# Patient Record
Sex: FEMALE | Race: WHITE | NOT HISPANIC OR LATINO | Employment: UNEMPLOYED | URBAN - METROPOLITAN AREA
[De-identification: names, ages, dates, MRNs, and addresses within clinical notes are randomized per-mention and may not be internally consistent; named-entity substitution may affect disease eponyms.]

---

## 2022-12-16 ENCOUNTER — HOSPITAL ENCOUNTER (EMERGENCY)
Facility: HOSPITAL | Age: 42
Discharge: HOME | End: 2022-12-17
Attending: EMERGENCY MEDICINE
Payer: MEDICAID

## 2022-12-16 DIAGNOSIS — G40.909 RECURRENT SEIZURES: ICD-10-CM

## 2022-12-16 DIAGNOSIS — R93.0 ABNORMAL HEAD CT: ICD-10-CM

## 2022-12-16 DIAGNOSIS — R56.9 SEIZURE: Primary | ICD-10-CM

## 2022-12-16 LAB
ALBUMIN SERPL BCP-MCNC: 3.4 G/DL (ref 3.5–5.2)
ALP SERPL-CCNC: 174 U/L (ref 55–135)
ALT SERPL W/O P-5'-P-CCNC: 32 U/L (ref 10–44)
ANION GAP SERPL CALC-SCNC: 11 MMOL/L (ref 8–16)
AST SERPL-CCNC: 48 U/L (ref 10–40)
BASOPHILS # BLD AUTO: 0.07 K/UL (ref 0–0.2)
BASOPHILS NFR BLD: 1 % (ref 0–1.9)
BILIRUB SERPL-MCNC: 0.8 MG/DL (ref 0.1–1)
BUN SERPL-MCNC: 5 MG/DL (ref 6–20)
CALCIUM SERPL-MCNC: 10 MG/DL (ref 8.7–10.5)
CHLORIDE SERPL-SCNC: 109 MMOL/L (ref 95–110)
CO2 SERPL-SCNC: 23 MMOL/L (ref 23–29)
CREAT SERPL-MCNC: 0.5 MG/DL (ref 0.5–1.4)
DIFFERENTIAL METHOD: ABNORMAL
EOSINOPHIL # BLD AUTO: 0.4 K/UL (ref 0–0.5)
EOSINOPHIL NFR BLD: 5.1 % (ref 0–8)
ERYTHROCYTE [DISTWIDTH] IN BLOOD BY AUTOMATED COUNT: 13.8 % (ref 11.5–14.5)
EST. GFR  (NO RACE VARIABLE): >60 ML/MIN/1.73 M^2
ETHANOL SERPL-MCNC: <10 MG/DL
GLUCOSE SERPL-MCNC: 100 MG/DL (ref 70–110)
HCT VFR BLD AUTO: 32.2 % (ref 37–48.5)
HCV AB SERPL QL IA: NORMAL
HGB BLD-MCNC: 10.2 G/DL (ref 12–16)
HIV 1+2 AB+HIV1 P24 AG SERPL QL IA: NORMAL
IMM GRANULOCYTES # BLD AUTO: 0.03 K/UL (ref 0–0.04)
IMM GRANULOCYTES NFR BLD AUTO: 0.4 % (ref 0–0.5)
INR PPP: 1.1 (ref 0.8–1.2)
LYMPHOCYTES # BLD AUTO: 2.1 K/UL (ref 1–4.8)
LYMPHOCYTES NFR BLD: 28.6 % (ref 18–48)
MCH RBC QN AUTO: 28.7 PG (ref 27–31)
MCHC RBC AUTO-ENTMCNC: 31.7 G/DL (ref 32–36)
MCV RBC AUTO: 91 FL (ref 82–98)
MONOCYTES # BLD AUTO: 0.5 K/UL (ref 0.3–1)
MONOCYTES NFR BLD: 6.8 % (ref 4–15)
NEUTROPHILS # BLD AUTO: 4.2 K/UL (ref 1.8–7.7)
NEUTROPHILS NFR BLD: 58.1 % (ref 38–73)
NRBC BLD-RTO: 0 /100 WBC
PLATELET # BLD AUTO: 330 K/UL (ref 150–450)
PMV BLD AUTO: 9.3 FL (ref 9.2–12.9)
POTASSIUM SERPL-SCNC: 4.2 MMOL/L (ref 3.5–5.1)
PROT SERPL-MCNC: 7.6 G/DL (ref 6–8.4)
PROTHROMBIN TIME: 11.5 SEC (ref 9–12.5)
RBC # BLD AUTO: 3.55 M/UL (ref 4–5.4)
SODIUM SERPL-SCNC: 143 MMOL/L (ref 136–145)
WBC # BLD AUTO: 7.24 K/UL (ref 3.9–12.7)

## 2022-12-16 PROCEDURE — 25000003 PHARM REV CODE 250

## 2022-12-16 PROCEDURE — 94761 N-INVAS EAR/PLS OXIMETRY MLT: CPT

## 2022-12-16 PROCEDURE — 99285 PR EMERGENCY DEPT VISIT,LEVEL V: ICD-10-PCS | Mod: ,,, | Performed by: EMERGENCY MEDICINE

## 2022-12-16 PROCEDURE — 99285 EMERGENCY DEPT VISIT HI MDM: CPT | Mod: 25

## 2022-12-16 PROCEDURE — 87389 HIV-1 AG W/HIV-1&-2 AB AG IA: CPT | Performed by: PHYSICIAN ASSISTANT

## 2022-12-16 PROCEDURE — 99285 EMERGENCY DEPT VISIT HI MDM: CPT | Mod: ,,, | Performed by: EMERGENCY MEDICINE

## 2022-12-16 PROCEDURE — 82077 ASSAY SPEC XCP UR&BREATH IA: CPT

## 2022-12-16 PROCEDURE — 80053 COMPREHEN METABOLIC PANEL: CPT

## 2022-12-16 PROCEDURE — 85025 COMPLETE CBC W/AUTO DIFF WBC: CPT

## 2022-12-16 PROCEDURE — 80177 DRUG SCRN QUAN LEVETIRACETAM: CPT

## 2022-12-16 PROCEDURE — 86803 HEPATITIS C AB TEST: CPT | Performed by: PHYSICIAN ASSISTANT

## 2022-12-16 PROCEDURE — 63600175 PHARM REV CODE 636 W HCPCS

## 2022-12-16 PROCEDURE — 85610 PROTHROMBIN TIME: CPT

## 2022-12-16 PROCEDURE — 96374 THER/PROPH/DIAG INJ IV PUSH: CPT

## 2022-12-16 RX ORDER — LACOSAMIDE 50 MG/1
100 TABLET ORAL ONCE
Status: COMPLETED | OUTPATIENT
Start: 2022-12-16 | End: 2022-12-16

## 2022-12-16 RX ORDER — HYDROMORPHONE HYDROCHLORIDE 1 MG/ML
0.5 INJECTION, SOLUTION INTRAMUSCULAR; INTRAVENOUS; SUBCUTANEOUS
Status: COMPLETED | OUTPATIENT
Start: 2022-12-16 | End: 2022-12-16

## 2022-12-16 RX ORDER — LEVETIRACETAM 500 MG/1
1000 TABLET ORAL ONCE
Status: COMPLETED | OUTPATIENT
Start: 2022-12-16 | End: 2022-12-16

## 2022-12-16 RX ORDER — LEVETIRACETAM 1000 MG/1
1000 TABLET ORAL 2 TIMES DAILY
COMMUNITY
Start: 2022-12-15 | End: 2022-12-17 | Stop reason: SDUPTHER

## 2022-12-16 RX ORDER — ACETAMINOPHEN 500 MG
1000 TABLET ORAL ONCE
Status: COMPLETED | OUTPATIENT
Start: 2022-12-16 | End: 2022-12-16

## 2022-12-16 RX ORDER — AMLODIPINE BESYLATE 10 MG/1
10 TABLET ORAL
COMMUNITY
Start: 2022-12-14

## 2022-12-16 RX ORDER — LEVOTHYROXINE SODIUM 25 UG/1
25 TABLET ORAL
COMMUNITY
Start: 2022-12-15

## 2022-12-16 RX ORDER — SERTRALINE HYDROCHLORIDE 25 MG/1
25 TABLET, FILM COATED ORAL
COMMUNITY
Start: 2022-09-29

## 2022-12-16 RX ORDER — LACOSAMIDE 50 MG/1
100 TABLET ORAL EVERY 12 HOURS
Status: DISCONTINUED | OUTPATIENT
Start: 2022-12-16 | End: 2022-12-16

## 2022-12-16 RX ORDER — LEVETIRACETAM 10 MG/ML
1000 INJECTION INTRAVASCULAR
Status: DISCONTINUED | OUTPATIENT
Start: 2022-12-16 | End: 2022-12-16

## 2022-12-16 RX ORDER — LEVETIRACETAM 500 MG/1
1000 TABLET ORAL 2 TIMES DAILY
Status: DISCONTINUED | OUTPATIENT
Start: 2022-12-16 | End: 2022-12-16

## 2022-12-16 RX ADMIN — ACETAMINOPHEN 1000 MG: 500 TABLET ORAL at 07:12

## 2022-12-16 RX ADMIN — HYDROMORPHONE HYDROCHLORIDE 0.5 MG: 1 INJECTION, SOLUTION INTRAMUSCULAR; INTRAVENOUS; SUBCUTANEOUS at 11:12

## 2022-12-16 RX ADMIN — LEVETIRACETAM 1000 MG: 500 TABLET, FILM COATED ORAL at 07:12

## 2022-12-16 RX ADMIN — LACOSAMIDE 100 MG: 50 TABLET, FILM COATED ORAL at 07:12

## 2022-12-17 VITALS
SYSTOLIC BLOOD PRESSURE: 142 MMHG | HEART RATE: 70 BPM | OXYGEN SATURATION: 98 % | TEMPERATURE: 98 F | DIASTOLIC BLOOD PRESSURE: 88 MMHG | WEIGHT: 110 LBS | RESPIRATION RATE: 16 BRPM

## 2022-12-17 PROBLEM — I62.9 INTRACRANIAL HEMORRHAGE: Status: ACTIVE | Noted: 2022-12-17

## 2022-12-17 PROCEDURE — 25000003 PHARM REV CODE 250: Performed by: INTERNAL MEDICINE

## 2022-12-17 PROCEDURE — 99284 PR EMERGENCY DEPT VISIT,LEVEL IV: ICD-10-PCS | Mod: ,,, | Performed by: STUDENT IN AN ORGANIZED HEALTH CARE EDUCATION/TRAINING PROGRAM

## 2022-12-17 PROCEDURE — 99284 EMERGENCY DEPT VISIT MOD MDM: CPT | Mod: ,,, | Performed by: STUDENT IN AN ORGANIZED HEALTH CARE EDUCATION/TRAINING PROGRAM

## 2022-12-17 RX ORDER — LEVETIRACETAM 1000 MG/1
1000 TABLET ORAL 2 TIMES DAILY
Qty: 60 TABLET | Refills: 0 | Status: SHIPPED | OUTPATIENT
Start: 2022-12-17 | End: 2023-01-16

## 2022-12-17 RX ORDER — LEVETIRACETAM 1000 MG/1
1000 TABLET ORAL 2 TIMES DAILY
Qty: 60 TABLET | Refills: 0 | Status: SHIPPED | OUTPATIENT
Start: 2022-12-17

## 2022-12-17 RX ORDER — ACETAMINOPHEN 500 MG
1000 TABLET ORAL
Status: DISCONTINUED | OUTPATIENT
Start: 2022-12-17 | End: 2022-12-17

## 2022-12-17 RX ORDER — ACETAMINOPHEN 500 MG
1000 TABLET ORAL
Status: COMPLETED | OUTPATIENT
Start: 2022-12-17 | End: 2022-12-17

## 2022-12-17 RX ADMIN — ACETAMINOPHEN 1000 MG: 500 TABLET ORAL at 03:12

## 2022-12-17 NOTE — ED TRIAGE NOTES
"Pt states "I had another seizure. I was at  yesterday for the same thing. I didn't take my keppra last night or today"  "

## 2022-12-17 NOTE — ED NOTES
Assumed care of patient. Patient is alert and resting comfortably in bed in NAD. BP, cardiac, and O2 monitoring continued. Patient updated on plan of care, pt denies any needs or complaints at this time. Bed locked in lowest position, side rails up x2, call light within reach. VSS. Will continue to monitor.

## 2022-12-17 NOTE — HPI
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 Pt states she has not been taking her AEDs regularly since discharge and had a typical general seizure. CT showed left frontal hyper density with surrounding edema concerning for hemorrhage. Neurosurgery was consulted.
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
## 2022-12-17 NOTE — SUBJECTIVE & OBJECTIVE
(Not in a hospital admission)      Review of patient's allergies indicates:  No Known Allergies    History reviewed. No pertinent past medical history.  History reviewed. No pertinent surgical history.  Family History    None       Tobacco Use    Smoking status: Not on file    Smokeless tobacco: Not on file   Substance and Sexual Activity    Alcohol use: Not on file    Drug use: Not on file    Sexual activity: Not on file     Review of Systems   Constitutional:  Negative for fever.   HENT:  Negative for ear discharge and hearing loss.    Eyes:  Negative for visual disturbance.   Respiratory:  Negative for shortness of breath.    Cardiovascular:  Negative for chest pain.   Gastrointestinal:  Negative for nausea and vomiting.   Genitourinary:  Negative for urgency.   Musculoskeletal:  Negative for back pain and neck pain.   Neurological:  Positive for dizziness, seizures and weakness. Negative for light-headedness, numbness and headaches.   Psychiatric/Behavioral:  Negative for agitation and confusion.    Objective:     Weight: 49.9 kg (110 lb)  There is no height or weight on file to calculate BMI.  Vital Signs (Most Recent):  Temp: 98 °F (36.7 °C) (12/16/22 2125)  Pulse: 79 (12/16/22 2125)  Resp: 18 (12/16/22 2342)  BP: (!) 145/94 (12/16/22 2125)  SpO2: 98 % (12/16/22 2125)   Vital Signs (24h Range):  Temp:  [97.9 °F (36.6 °C)-98 °F (36.7 °C)] 98 °F (36.7 °C)  Pulse:  [70-79] 79  Resp:  [18-20] 18  SpO2:  [96 %-98 %] 98 %  BP: (133-145)/(72-94) 145/94                     Female External Urinary Catheter 12/16/22 2319 (Active)       Physical Exam    Neurosurgery Physical Exam  General: well developed, well nourished, no distress.   Head: normocephalic, atraumatic  CV: RRR, pulses equal bilateral  Pulmonary: normal respirations, no signs of respiratory distress  Abdomen: soft, non-distended  Skin: Skin is warm, dry and intact.    Neuro:  E4V5M6  Awake, alert, Ox4  PERRL, EOMI  CNII-XII grossly intact  Motor: Follows  commands full strength right side. Hemiparetic but antigravity throughout left side  SILT    Significant Labs:  Recent Labs   Lab 12/16/22  1850         K 4.2      CO2 23   BUN 5*   CREATININE 0.5   CALCIUM 10.0     Recent Labs   Lab 12/16/22  1850   WBC 7.24   HGB 10.2*   HCT 32.2*        Recent Labs   Lab 12/16/22  1850   INR 1.1     Microbiology Results (last 7 days)       ** No results found for the last 168 hours. **          All pertinent labs from the last 24 hours have been reviewed.    Significant Diagnostics:  I have reviewed all pertinent imaging results/findings within the past 24 hours.  I have reviewed and interpreted all pertinent imaging results/findings within the past 24 hours.

## 2022-12-17 NOTE — ASSESSMENT & PLAN NOTE
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 Pt states she has not been taking her AEDs regularly since discharge and had a typical general seizure. CT showed left frontal hyper density with surrounding edema concerning for hemorrhage. Neurosurgery was consulted.    Plan:  No surgical intervention indicated  FU repeat CTH for stability  Load with AED  Pt needs neurology workup/follow up for seizure history and education on AED

## 2022-12-17 NOTE — ED PROVIDER NOTES
Signout Note  I received signout from the previous providers.     Chief complaint:  Seizures (Unwitnessed seizure per pt, unsure of duration. Extensive seizure hx r/t epilepsy. Compliant with meds. AAOX4, GCS15 at this time. )    Pertinent History, ED course, Disposition:    Per sign out and chart review: Harvey Gould is a 42 y.o. 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     Pt signed out to me pending:  CT head without contrast with plan disposition home pending final neurosurgery recommendations.    Update/ Disposition:  Repeat CT head returned with no interval changes.  Neurosurgery saw the CT head and stated the patient was safe for discharge home.  Patient was discharged home with a script for Keppra.  She states that she feels safe going home and protect her medications.  She walk safely with nursing yet does not have a walker to go home so she was discharged with a walker.  She states that she is a walker at home.  She was given an ambulatory referral for Neurology.  Her pain is controlled.  Her repeat neuro exam has no focalities.  Strict return precautions were discussed.  She was given the number to follow-up with Neurology.  Patient deemed stable for discharge after all questions were answered.      Patient,caregiver and/or family understands the plan and verbalized agreement. All questions answered.     Diagnostic Impression:    1. Seizure    2. Recurrent seizures    3. Abnormal head CT         ED Disposition Condition    Discharge Stable          ED Prescriptions       Medication Sig Dispense Start Date End Date Auth. Provider    levETIRAcetam (KEPPRA) 1000 MG tablet Take 1 tablet (1,000 mg total) by mouth 2 (two) times daily. 60 tablet 12/17/2022 -- Morena Charles,     levETIRAcetam (KEPPRA) 1000 MG tablet  Take 1 tablet (1,000 mg total) by mouth 2 (two) times daily. 60 tablet 12/17/2022 1/16/2023 Huong Haynes MD          Follow-up Information       Follow up With Specialties Details Why Contact Info Additional Information    Hima Gudino MD Family Medicine In 2 days  [address] [facility] [facility]  [place] [address]  [phone]       [facility] - Emergency Dept Emergency Medicine  If symptoms worsen [address] [id number] [phone]     [facility] - Neurology [facility] Neurology Schedule an appointment as soon as possible for a visit in 1 day  [address] [facility] [address] [id number] [phone] [facility] [place] [facility] Building, 7th Floor Please park in [facility] and take Clinic elevator                       Vitals:    12/17/22 0200   BP: (!) 145/90   Pulse: 72   Resp:    Temp:        Labs Reviewed   CBC W/ AUTO DIFFERENTIAL - Abnormal; Notable for the following components:       Result Value    RBC 3.55 (*)     Hemoglobin 10.2 (*)     Hematocrit 32.2 (*)     MCHC 31.7 (*)     All other components within normal limits   COMPREHENSIVE METABOLIC PANEL - Abnormal; Notable for the following components:    BUN 5 (*)     Albumin 3.4 (*)     Alkaline Phosphatase 174 (*)     AST 48 (*)     All other components within normal limits   HIV 1 / 2 ANTIBODY    Narrative:     Release to patient->Immediate   HEPATITIS C ANTIBODY    Narrative:     Release to patient->Immediate   PROTIME-INR   ALCOHOL,MEDICAL (ETHANOL)   LEVETIRACETAM  (KEPPRA) LEVEL       Imaging Studies:    CT Head Without Contrast   Final Result      No interval detrimental changes from prior exam on 12/16/2022 at 20:11.      Similar-appearing left inferior frontal hemorrhage with surrounding vasogenic edema and mass effect.  Underlying mass lesion not excluded.  Stable 5 mm rightward midline shift.      Similar calcifications versus blood products along the right superior vermis and posterior midbrain.       Comparison with any outside imaging would be helpful.      Electronically signed by resident: Luke Varghese   Date:    12/17/2022   Time:    03:23      Electronically signed by: Antonio Pappas MD   Date:    12/17/2022   Time:    03:52      CT Head Without Contrast   Final Result   Abnormal      Calcified or hemorrhagic gyriform left subfrontal lesion with adjacent vasogenic edema in the left frontal lobe. Lesion measures approximately 3.2 x 1.6 x 1.8 cm and appears to have somewhat gyriform configuration along the inferior left frontal lobe. Some localized mass effect in the left frontal lobe with approximately 5 mm left to right midline shift of the anterior inter hemispheric fissure.      Additional calcifications versus blood products along the right superior vermis and adjacent posterior midbrain along either side of the ambient cistern on the right.      Right anterior temporal encephalomalacia extending into the right insula, deep gray matter and internal capsule into the right corona radiata and centrum semiovale white matter. Minimal gyriform calcifications right temporal lobe.      Nondisplaced left open fracture of uncertain age.      Additional findings as above.      This report was flagged in Epic as abnormal.      The critical information above was relayed directly by Antonio Pappas MD by [employer] secure chat to Morena Charles on 12/16/2022 at 20:48.         Electronically signed by: Antonio Pappas MD   Date:    12/16/2022   Time:    20:49          Medications Given:  Medications   levETIRAcetam tablet 1,000 mg (1,000 mg Oral Given 12/16/22 1926)   lacosamide tablet 100 mg (100 mg Oral Given 12/16/22 1926)   acetaminophen tablet 1,000 mg (1,000 mg Oral Given 12/16/22 1926)   HYDROmorphone injection 0.5 mg (0.5 mg Intravenous Given 12/16/22 2342)   acetaminophen tablet 1,000 mg (1,000 mg Oral Given 12/17/22 0331)                Please note that this dictation was completed with computer voice recognition  software.  Quite often unanticipated grammatical, syntax, homophones, and other interpretive errors are inadvertently transcribed by the computer software.  Please disregard these errors.  Please excuse any errors that have escaped final proofreading.         Huong Haynes MD  Resident  12/17/22 0226

## 2022-12-17 NOTE — DISCHARGE INSTRUCTIONS
Diagnosis:   1. Seizure    2. Recurrent seizures        Tests you had showed:   Labs Reviewed   CBC W/ AUTO DIFFERENTIAL - Abnormal; Notable for the following components:       Result Value    RBC 3.55 (*)     Hemoglobin 10.2 (*)     Hematocrit 32.2 (*)     MCHC 31.7 (*)     All other components within normal limits   COMPREHENSIVE METABOLIC PANEL - Abnormal; Notable for the following components:    BUN 5 (*)     Albumin 3.4 (*)     Alkaline Phosphatase 174 (*)     AST 48 (*)     All other components within normal limits   HIV 1 / 2 ANTIBODY    Narrative:     Release to patient->Immediate   HEPATITIS C ANTIBODY    Narrative:     Release to patient->Immediate   PROTIME-INR   ALCOHOL,MEDICAL (ETHANOL)   LEVETIRACETAM  (KEPPRA) LEVEL      CT Head Without Contrast   Final Result      No interval detrimental changes from prior exam on 12/16/2022 at 20:11.      Similar-appearing left inferior frontal hemorrhage with surrounding vasogenic edema and mass effect.  Underlying mass lesion not excluded.  Stable 5 mm rightward midline shift.      Similar calcifications versus blood products along the right superior vermis and posterior midbrain.      Comparison with any outside imaging would be helpful.      Electronically signed by resident: Luke Varghese   Date:    12/17/2022   Time:    03:23      Electronically signed by: Antonio Pappas MD   Date:    12/17/2022   Time:    03:52      CT Head Without Contrast   Final Result   Abnormal      Calcified or hemorrhagic gyriform left subfrontal lesion with adjacent vasogenic edema in the left frontal lobe. Lesion measures approximately 3.2 x 1.6 x 1.8 cm and appears to have somewhat gyriform configuration along the inferior left frontal lobe. Some localized mass effect in the left frontal lobe with approximately 5 mm left to right midline shift of the anterior inter hemispheric fissure.      Additional calcifications versus blood products along the right superior vermis and adjacent  posterior midbrain along either side of the ambient cistern on the right.      Right anterior temporal encephalomalacia extending into the right insula, deep gray matter and internal capsule into the right corona radiata and centrum semiovale white matter. Minimal gyriform calcifications right temporal lobe.      Nondisplaced left open fracture of uncertain age.      Additional findings as above.      This report was flagged in Epic as abnormal.      The critical information above was relayed directly by Antonio Pappas MD by [facility] secure chat to Morena Charles on 12/16/2022 at 20:48.         Electronically signed by: Antonio Pappas MD   Date:    12/16/2022   Time:    20:49          Treatments you received were:   Medications   levETIRAcetam tablet 1,000 mg (1,000 mg Oral Given 12/16/22 1926)   lacosamide tablet 100 mg (100 mg Oral Given 12/16/22 1926)   acetaminophen tablet 1,000 mg (1,000 mg Oral Given 12/16/22 1926)   HYDROmorphone injection 0.5 mg (0.5 mg Intravenous Given 12/16/22 2342)   acetaminophen tablet 1,000 mg (1,000 mg Oral Given 12/17/22 0331)       Home Care Instructions:  - Medications: Continue taking your home medications as prescribed    Follow-Up Plan:  - Follow-up with: Primary care doctor within 1-2  days  - Additional testing and/or evaluation will be directed by your primary doctor    Return to the Emergency Department for symptoms including but not limited to: worsening symptoms, severe back pain, shortness of breath or chest pain, vomiting with inability to hold down fluids, blood in vomit or poop, fevers greater than 100.4°F, passing out/fainting/unconsciousness, or other concerning symptoms.     No future appointments.

## 2022-12-17 NOTE — ED NOTES
"Patient identifiers verified and correct for Harvey Gould  C/C: Pt states "I had another seizure. I was at  yesterday for the same thing. I didn't take my keppra last night or today"  APPEARANCE: awake and alert in no acute distress.  SKIN: warm, dry and intact. No breakdown or bruising.  MUSCULOSKELETAL: Patient moving all extremities spontaneously, no obvious swelling or deformities noted. Ambulates independently at baseline, currently c/o generalized weakness.  RESPIRATORY: Denies shortness of breath. Respirations unlabored.   CARDIAC: Denies CP, 2+ distal pulses; no peripheral edema  ABDOMEN: soft, non-tender, and non-distended, Denies N/V  : voids spontaneously, denies difficulty  Neurologic: AAO x 4; follows commands equal strength in all extremities; denies numbness/tingling. Denies dizziness. C/o h/a, confirms hitting head   "

## 2022-12-17 NOTE — CONSULTS
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 Pt states she has not been taking her AEDs regularly since discharge and had a typical general seizure. CT showed left frontal hyper density with surrounding edema concerning for hemorrhage. Neurosurgery was consulted.      (Not in a hospital admission)      Review of patient's allergies indicates:  No Known Allergies    History reviewed. No pertinent past medical history.  History reviewed. No pertinent surgical history.  Family History    None       Tobacco Use    Smoking status: Not on file    Smokeless tobacco: Not on file   Substance and Sexual Activity    Alcohol use: Not on file    Drug use: Not on file    Sexual activity: Not on file     Review of Systems   Constitutional:  Negative for fever.   HENT:  Negative for ear discharge and hearing loss.    Eyes:  Negative for visual disturbance.   Respiratory:  Negative for shortness of breath.    Cardiovascular:  Negative for chest pain.   Gastrointestinal:  Negative for nausea and vomiting.   Genitourinary:  Negative for urgency.   Musculoskeletal:  Negative for back pain and neck pain.   Neurological:  Positive for dizziness, seizures and weakness. Negative for light-headedness, numbness and headaches.   Psychiatric/Behavioral:  Negative for agitation and confusion.    Objective:     Weight: 49.9 kg (110 lb)  There is no height or weight on file to calculate BMI.  Vital Signs (Most Recent):  Temp: 98 °F (36.7 °C)  (12/16/22 2125)  Pulse: 79 (12/16/22 2125)  Resp: 18 (12/16/22 2342)  BP: (!) 145/94 (12/16/22 2125)  SpO2: 98 % (12/16/22 2125)   Vital Signs (24h Range):  Temp:  [97.9 °F (36.6 °C)-98 °F (36.7 °C)] 98 °F (36.7 °C)  Pulse:  [70-79] 79  Resp:  [18-20] 18  SpO2:  [96 %-98 %] 98 %  BP: (133-145)/(72-94) 145/94                     Female External Urinary Catheter 12/16/22 2319 (Active)       Physical Exam    Neurosurgery Physical Exam  General: well developed, well nourished, no distress.   Head: normocephalic, atraumatic  CV: RRR, pulses equal bilateral  Pulmonary: normal respirations, no signs of respiratory distress  Abdomen: soft, non-distended  Skin: Skin is warm, dry and intact.    Neuro:  E4V5M6  Awake, alert, Ox4  PERRL, EOMI  CNII-XII grossly intact  Motor: Follows commands full strength right side. Hemiparetic but antigravity throughout left side  SILT    Significant Labs:  Recent Labs   Lab 12/16/22  1850         K 4.2      CO2 23   BUN 5*   CREATININE 0.5   CALCIUM 10.0     Recent Labs   Lab 12/16/22  1850   WBC 7.24   HGB 10.2*   HCT 32.2*        Recent Labs   Lab 12/16/22  1850   INR 1.1     Microbiology Results (last 7 days)       ** No results found for the last 168 hours. **          All pertinent labs from the last 24 hours have been reviewed.    Significant Diagnostics:  I have reviewed all pertinent imaging results/findings within the past 24 hours.  I have reviewed and interpreted all pertinent imaging results/findings within the past 24 hours.    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 Pt states she has not been taking her AEDs regularly since discharge and had a typical general seizure. CT  showed left frontal hyper density with surrounding edema concerning for hemorrhage. Neurosurgery was consulted.    Plan:  No surgical intervention indicated  FU repeat CTH for stability  Load with AED  Pt needs neurology workup/follow up for seizure history and education on AED            Thank you for your consult. I will follow-up with patient. Please contact us if you have any additional questions.    Chi Deluna MD  Neurosurgery  [facility] - Emergency Dept

## 2022-12-19 LAB — LEVETIRACETAM SERPL-MCNC: 12.9 UG/ML (ref 3–60)

## 2022-12-19 NOTE — PLAN OF CARE
D/C planning:  Unable to get thru any phone numbers in the chart  Emailed the patient local numbers for [place] non medicaid transport  Case mgt to remain supportive.    [facility] - Emergency Dept  Initial Discharge Assessment       Primary Care Provider: Hima Gudino MD    Admission Diagnosis: No admission diagnoses are documented for this encounter.    Admission Date: 12/16/2022  Expected Discharge Date:          Payor: MEDICAID / Plan: [facility] COMMUNITY PLAN [facility] ([place] MEDICAID) / Product Type: Managed Medicaid /     Extended Emergency Contact Information  Primary Emergency Contact: ALFREDITO Gouldag  Mobile Phone: [phone]  Relation: Sister  Secondary Emergency Contact: MeraManjeet  Mobile Phone: [phone]  Relation: Brother  Preferred language: English   needed? No    Discharge Plan A: (P) Home         [id number] - [place] - [address] [facility] [address]  [address] [place] [address]  Phone: [phone] Fax: [phone]      Initial Assessment (most recent)       Adult Discharge Assessment - 12/19/22 1306          Discharge Assessment    Assessment Type Discharge Planning Assessment (P)      Confirmed/corrected address, phone number and insurance No (P)      Source of Information health record (P)      Discharge Plan A Home (P)      DME Needed Upon Discharge  none (P)

## 2022-12-19 NOTE — PLAN OF CARE
12/19/22 1309   Post-Acute Status   Post-Acute Authorization Other   Other Status Community Services   Hospital Resources/Appts/Education Provided Provided patient/caregiver with written discharge plan information   Discharge Plan   Discharge Plan A Home

## 2023-01-04 ENCOUNTER — HOSPITAL ENCOUNTER (EMERGENCY)
Facility: HOSPITAL | Age: 43
Discharge: HOME | End: 2023-01-04
Attending: STUDENT IN AN ORGANIZED HEALTH CARE EDUCATION/TRAINING PROGRAM
Payer: MEDICAID

## 2023-01-04 VITALS
RESPIRATION RATE: 15 BRPM | HEART RATE: 111 BPM | WEIGHT: 95 LBS | TEMPERATURE: 98 F | HEIGHT: 57 IN | DIASTOLIC BLOOD PRESSURE: 75 MMHG | BODY MASS INDEX: 20.49 KG/M2 | OXYGEN SATURATION: 99 % | SYSTOLIC BLOOD PRESSURE: 128 MMHG

## 2023-01-04 DIAGNOSIS — G44.309 POST-TRAUMATIC HEADACHE, NOT INTRACTABLE, UNSPECIFIED CHRONICITY PATTERN: Primary | ICD-10-CM

## 2023-01-04 DIAGNOSIS — R29.6 FREQUENT FALLS: ICD-10-CM

## 2023-01-04 PROCEDURE — 99285 EMERGENCY DEPT VISIT HI MDM: CPT | Mod: 25

## 2023-01-04 PROCEDURE — 96374 THER/PROPH/DIAG INJ IV PUSH: CPT

## 2023-01-04 PROCEDURE — 96375 TX/PRO/DX INJ NEW DRUG ADDON: CPT

## 2023-01-04 PROCEDURE — 63600175 PHARM REV CODE 636 W HCPCS: Performed by: STUDENT IN AN ORGANIZED HEALTH CARE EDUCATION/TRAINING PROGRAM

## 2023-01-04 RX ORDER — PROCHLORPERAZINE EDISYLATE 5 MG/ML
10 INJECTION INTRAMUSCULAR; INTRAVENOUS ONCE
Status: COMPLETED | OUTPATIENT
Start: 2023-01-04 | End: 2023-01-04

## 2023-01-04 RX ORDER — DROPERIDOL 2.5 MG/ML
1.25 INJECTION, SOLUTION INTRAMUSCULAR; INTRAVENOUS ONCE
Status: COMPLETED | OUTPATIENT
Start: 2023-01-04 | End: 2023-01-04

## 2023-01-04 RX ORDER — ONDANSETRON 4 MG/1
4 TABLET, ORALLY DISINTEGRATING ORAL EVERY 6 HOURS PRN
Qty: 15 TABLET | Refills: 0 | Status: SHIPPED | OUTPATIENT
Start: 2023-01-04 | End: 2023-12-07 | Stop reason: CLARIF

## 2023-01-04 RX ADMIN — PROCHLORPERAZINE EDISYLATE 10 MG: 5 INJECTION INTRAMUSCULAR; INTRAVENOUS at 02:01

## 2023-01-04 RX ADMIN — DROPERIDOL 1.25 MG: 2.5 INJECTION, SOLUTION INTRAMUSCULAR; INTRAVENOUS at 03:01

## 2023-01-04 NOTE — ED TRIAGE NOTES
Harvey Gould, a 42 y.o. female presents to the ED w/ complaint of headache after having a fall yesterday and hitting her head    Triage note:  Chief Complaint   Patient presents with    Headache     Headache since last night after falling and hitting her head.      Review of patient's allergies indicates:  No Known Allergies  No past medical history on file.

## 2023-01-04 NOTE — ED NOTES
LOC: The patient is awake, alert, and oriented to self, place, time, and situation. Pt is calm and cooperative. Affect is appropriate.  Speech is appropriate and clear.     APPEARANCE: Patient resting comfortably in no acute distress.  Patient is clean and well groomed.    SKIN: The skin is warm and dry; color consistent with ethnicity.  Patient has normal skin turgor and moist mucus membranes.  Skin intact; no breakdown or bruising noted.     MUSCULOSKELETAL: Patient moving R upper and lower extremities without difficulty; pt has difficulty at baseline to the L upper and lower extremities from a previous CVA; denies pain in the extremities or back.      RESPIRATORY: Airway is open and patent. Respirations spontaneous, even, easy, and non-labored.  Patient has a normal effort and rate.  No accessory muscle use noted. Denies cough.     CARDIAC:  Normal rate noted.  No peripheral edema noted. No complaints of chest pain.      ABDOMEN: Soft and non tender to palpation.  No distention noted. Pt denies abdominal pain; denies nausea, vomiting, diarrhea, or constipation.    NEUROLOGIC: Eyes open spontaneously.  Behavior appropriate to situation.  Follows commands; facial expression symmetrical.  Purposeful motor response noted; normal sensation to pts baseline in all extremities. Pt reports headache; denies lightheadedness or dizziness; denies visual disturbances; denies loss of balance.

## 2023-01-04 NOTE — ED PROVIDER NOTES
NAME:  Harvey Gould  CSN:     [id number]  MRN:    [id number]  ADMIT DATE: 1/4/2023        eMERGENCY dEPARTMENT eNCOUnter    CHIEF COMPLAINT    Chief Complaint   Patient presents with    Headache     Headache since last night after falling and hitting her head.        ESDRAS Ty is a 42 y.o. female with a past medical history of  has no past medical history on file.     she presents to the ED due to persistent diffuse HA with nausea, vomiting, photophobia.  Patient states she has had headaches similar to this fairly frequently since sustaining subdural hematoma a little over a month ago.  She states she does fall frequently due to residual left-sided deficits from previous stroke and that her apartment is too small to use a walker or her 3 pronged cane which causes her to slip and fall.  She does have a partner at home, but does not feel that they are very appropriate caregiver.  Was last evaluated 2 days ago after having a ground level fall when she hit the back of her head on the bathtub.  States she is had persistent headache since that time with 2 episodes of vomiting, 1 yesterday and 1 today.  She was evaluated at that time without any interval progression of previous intracranial bleed.  She has not taken anything for her symptoms at home thus far as she was concerned that the active medications may interact with her seizure medications.  States she just started getting seizures fairly recently and does not currently see Neurology as she can not travel to where her previous neurologist was since she can not currently dry.  She does note that there was some thought that her seizures may be related to stopping alcohol use and she states she is not drank any alcohol in 1 month.  She denies any new numbness or weakness and just notes her chronic residual numbness and weakness to the left arm and leg.  She states she is under increased stress at home due to her being currently unemployed and giving a guardianship of  "her son to her sister.      HPI       PAST MEDICAL HISTORY  History reviewed. No pertinent past medical history.    SURGICAL HISTORY    History reviewed. No pertinent surgical history.    FAMILY HISTORY    History reviewed. No pertinent family history.    SOCIAL HISTORY    Social History     Socioeconomic History    Marital status: Single       MEDICATIONS  Current Outpatient Medications   Medication Instructions    amLODIPine (NORVASC) 10 mg, Oral    levETIRAcetam (KEPPRA) 1,000 mg, Oral, 2 times daily    levETIRAcetam (KEPPRA) 1,000 mg, Oral, 2 times daily    levothyroxine (SYNTHROID) 25 mcg, Oral    ondansetron (ZOFRAN-ODT) 4 mg, Oral, Every 6 hours PRN    sertraline (ZOLOFT) 25 mg, Oral       ALLERGIES    Review of patient's allergies indicates:  No Known Allergies      REVIEW OF SYSTEMS   Review of Systems   Constitutional:  Negative for fever.   HENT:  Negative for sore throat.    Eyes:  Positive for photophobia. Negative for visual disturbance.   Respiratory:  Negative for shortness of breath.    Cardiovascular:  Negative for chest pain.   Gastrointestinal:  Positive for nausea and vomiting. Negative for abdominal pain.   Genitourinary:  Negative for dysuria.   Musculoskeletal:  Negative for back pain.   Skin:  Negative for rash.   Neurological:  Positive for weakness (chronic LUE, LLE), numbness (LUE, LLE, chronic) and headaches.   Hematological:  Does not bruise/bleed easily.        PHYSICAL EXAM    Reviewed Triage Note    VITAL SIGNS:   ED Triage Vitals [01/04/23 1303]   Enc Vitals Group      BP (!) 129/91      Pulse 87      Resp       Temp 98.4 °F (36.9 °C)      Temp src Oral      SpO2 98 %      Weight 95 lb      Height 4' 9"      Head Circumference       Peak Flow       Pain Score       Pain Loc       Pain Edu?       Excl. in GC?        Patient Vitals for the past 24 hrs:   BP Temp Temp src Pulse Resp SpO2 Height Weight   01/04/23 1511 -- -- -- -- 16 -- -- --   01/04/23 1510 -- -- -- -- 20 -- -- -- " "  01/04/23 1502 99/61 -- -- 82 (!) 22 96 % -- --   01/04/23 1329 113/77 -- -- 83 -- 98 % -- --   01/04/23 1303 (!) 129/91 98.4 °F (36.9 °C) Oral 87 -- 98 % 4' 9" (1.448 m) 43.1 kg (95 lb)       Physical Exam    Nursing note and vitals reviewed.  Constitutional: She appears well-developed and well-nourished.   HENT:   Head: Normocephalic.   2 x 2 cm scalp hematoma to R posterior occiput. No bogginess noted.    Eyes: EOM are normal. Pupils are equal, round, and reactive to light.   Neck: Neck supple.   Normal range of motion.  Cardiovascular:  Normal rate and regular rhythm.           Pulmonary/Chest: Breath sounds normal. No respiratory distress.   Abdominal: Abdomen is soft. There is no abdominal tenderness.   Musculoskeletal:         General: Normal range of motion.      Cervical back: Normal range of motion and neck supple.     Neurological: She is alert and oriented to person, place, and time. No cranial nerve deficit.   4/5 strength of LUE, LLE. 5/5 strength of RUE and RLE.    Skin: Skin is warm and dry.   Psychiatric: She has a normal mood and affect.          EKG     Interpreted by EM physician if performed:               LABS  Pertinent labs reviewed. (See chart for details)   Labs Reviewed - No data to display      RADIOLOGY          Imaging Results              CT Head Without Contrast (Final result)  Result time 01/04/23 15:01:46      Final result by Chi Taylor MD (01/04/23 15:01:46)                   Impression:      Improved edema/hemorrhage in the inferior left frontal lobe.    Stable curvilinear hyperattenuation in the right superior cerebellum and right dorsal midbrain, without corresponding mass effect or edema.  This is thought unlikely to reflect hemorrhage given stability, possible underlying cavernous malformation or other dystrophic calcification.    Stable large region encephalomalacia in right cerebral hemisphere, presumably postsurgical in nature.    Left maxillary " sinusitis.    Recommend correlation clinical history and prior imaging.  MRI for further assessment if warranted.      Electronically signed by: Chi Taylor MD  Date:    01/04/2023  Time:    15:01               Narrative:    EXAMINATION:  CT HEAD WITHOUT CONTRAST    CLINICAL HISTORY:  Headache, new or worsening, neuro deficit (Age 19-49y);subacute SAH;    TECHNIQUE:  Low dose axial CT images obtained throughout the head without the use of intravenous contrast.  Axial, sagittal and coronal reconstructions were performed.    COMPARISON:  12/17/2022    FINDINGS:  Ventricles stable in size, without evidence of hydrocephalus.  Ex vacuo dilatation of the right lateral ventricle.    Large area encephalomalacia in the right cerebral hemisphere, centered in the right temporal lobe and subinsular region.  Presumably postsurgical noting overlying craniotomy site.    Decreased volume of intraparenchymal hemorrhage in the inferior left frontal lobe with improved overlying edema.  No new parenchymal hemorrhage elsewhere.    Persistent curvilinear hyperattenuation in the right superior cerebellum as well as the dorsal right midbrain.  No associated mass effect or edema.    No new hemorrhage or major vascular distribution infarct elsewhere.    No extra-axial blood or fluid collections.    Skull/extracranial contents (limited evaluation):    No displaced calvarial fracture.    Persistent inflammatory changes throughout the partially visualized left maxillary sinus.                                        PROCEDURES    Procedures      ED COURSE & MEDICAL DECISION MAKING    Pertinent Labs & Imaging studies reviewed. (See chart for details and specific orders.)        Summary of review of records:   ROR shows ED visits 12/27, 12/29, 1/2 for head injuries.   CT head 1/2:   1. No new acute intracranial hemorrhage, hydrocephalus, herniation or midline shift.   2. Unchanged areas of subacute blood in the inferior parasagittal left  "frontal lobe and right posterior fossa.   3. Remote right temporal lobe infarct/encephalomalacia.     Ct neck 1/2:  No acute abnormality    11/23 admitted for subdural hematoma s/p status epilepticus, EtOH encephalopathy- discharge summary and d/c 12/14. "42-year-old woman with history of chronic alcohol abuse, ruptured cerebral aneurysm who presents with persistent seizure activity. Patient was initially refractory to treatment with Ativan and Keppra. She was emergently intubated for airway protection and developing hypoxia. She was admitted to the intensive care unit. Head imaging showed a frontal hematoma with subarachnoid bleeding. Neurology and Neurosurgery were consulted. Patient underwent cerebral angiography that was negative for acute aneurysm rupture or other active bleeding. She had complications including encephalopathy believed to be secondary to alcohol withdrawal, hyponatremia, hypertension."    MDM:  Harvey Gould is a 42 y.o. female who presents with 2 days of persistent headache, photophobia, nausea and vomiting since last fall on January 2nd.  Appreciate triage note, however, patient tells me that she is not had another fall since her recent ED visit at which time CT imaging of the head and neck were reassuring.  She is not taken anything at home for her symptoms since discharge from the emergency department 2 days ago.  She has had intermittent headaches over the past month since her traumatic brain injury and she states this does feel similar to that.  However, given history of subacute intracranial hemorrhage in the setting of recent repeat trauma with vomiting, will repeat head CT at this time.  Will initiate Compazine for headache and reassess.  Denies any new seizure-like activity.    Differential diagnosis includes but is not limited to:  Acute on chronic subdural hematoma, post concussive syndrome, migrainous headache            Medications   prochlorperazine injection Soln 10 mg (10 mg " Intravenous Given 1/4/23 1405)   droperidoL injection 1.25 mg (1.25 mg Intravenous Given 1/4/23 1536)       ED Course as of 01/04/23 1625   Wed Jan 04, 2023   1507 CT Head Without Contrast  Improved edema/hemorrhage in the inferior left frontal lobe.     Stable curvilinear hyperattenuation in the right superior cerebellum and right dorsal midbrain, without corresponding mass effect or edema.  This is thought unlikely to reflect hemorrhage given stability, possible underlying cavernous malformation or other dystrophic calcification.     Stable large region encephalomalacia in right cerebral hemisphere, presumably postsurgical in nature.     Left maxillary sinusitis. [HL]   1523 On reassessment, HA/nausea remains about the same, will trial additional meds prior to d/c home.  [HL]      ED Course User Index  [HL] Ivonne Babin DO     Patient does feel improved.  Neurology referral provided as well as prescription of Zofran.  Reasons to return discussed.  Stable at time discharge.    FINAL IMPRESSION    Final diagnoses:  [G44.309] Post-traumatic headache, not intractable, unspecified chronicity pattern (Primary)  [R29.6] Frequent falls       DISPOSITION  Patient discharged in stable condition             DISCLAIMER: This note was prepared with [email or web address] voice recognition transcription software. Garbled syntax, mangled pronouns, and other bizarre constructions may be attributed to that software system.      DO Ivonne Conte DO  01/04/23 1623

## 2023-07-05 ENCOUNTER — ANESTHESIA EVENT (OUTPATIENT)
Dept: ENDOSCOPY | Facility: OTHER | Age: 43
End: 2023-07-05
Payer: MEDICARE

## 2023-07-06 ENCOUNTER — HOSPITAL ENCOUNTER (OUTPATIENT)
Facility: OTHER | Age: 43
Discharge: HOME | End: 2023-07-06
Attending: INTERNAL MEDICINE | Admitting: INTERNAL MEDICINE
Payer: MEDICARE

## 2023-07-06 ENCOUNTER — ANESTHESIA (OUTPATIENT)
Dept: ENDOSCOPY | Facility: OTHER | Age: 43
End: 2023-07-06
Payer: MEDICARE

## 2023-07-06 VITALS
DIASTOLIC BLOOD PRESSURE: 83 MMHG | RESPIRATION RATE: 16 BRPM | HEIGHT: 57 IN | BODY MASS INDEX: 20.49 KG/M2 | OXYGEN SATURATION: 99 % | HEART RATE: 78 BPM | WEIGHT: 95 LBS | SYSTOLIC BLOOD PRESSURE: 118 MMHG | TEMPERATURE: 98 F

## 2023-07-06 DIAGNOSIS — R13.10 DYSPHAGIA, UNSPECIFIED TYPE: Primary | ICD-10-CM

## 2023-07-06 DIAGNOSIS — Z13.9 SCREENING DUE: ICD-10-CM

## 2023-07-06 LAB — HCG INTACT+B SERPL-ACNC: <1.2 MIU/ML

## 2023-07-06 PROCEDURE — 37000008 HC ANESTHESIA 1ST 15 MINUTES: Performed by: INTERNAL MEDICINE

## 2023-07-06 PROCEDURE — 63600175 PHARM REV CODE 636 W HCPCS: Performed by: ANESTHESIOLOGY

## 2023-07-06 PROCEDURE — D9220A PRA ANESTHESIA: ICD-10-PCS | Mod: ,,, | Performed by: NURSE ANESTHETIST, CERTIFIED REGISTERED

## 2023-07-06 PROCEDURE — 36415 COLL VENOUS BLD VENIPUNCTURE: CPT | Performed by: INTERNAL MEDICINE

## 2023-07-06 PROCEDURE — 88305 TISSUE EXAM BY PATHOLOGIST: CPT | Performed by: PATHOLOGY

## 2023-07-06 PROCEDURE — 88342 CHG IMMUNOCYTOCHEMISTRY: ICD-10-PCS | Mod: 26,,, | Performed by: PATHOLOGY

## 2023-07-06 PROCEDURE — 88305 TISSUE EXAM BY PATHOLOGIST: ICD-10-PCS | Mod: 26,,, | Performed by: PATHOLOGY

## 2023-07-06 PROCEDURE — 63600175 PHARM REV CODE 636 W HCPCS: Performed by: NURSE ANESTHETIST, CERTIFIED REGISTERED

## 2023-07-06 PROCEDURE — 88305 TISSUE EXAM BY PATHOLOGIST: CPT | Mod: 26,,, | Performed by: PATHOLOGY

## 2023-07-06 PROCEDURE — 88342 IMHCHEM/IMCYTCHM 1ST ANTB: CPT | Performed by: PATHOLOGY

## 2023-07-06 PROCEDURE — 84702 CHORIONIC GONADOTROPIN TEST: CPT | Performed by: INTERNAL MEDICINE

## 2023-07-06 PROCEDURE — 25000003 PHARM REV CODE 250: Performed by: NURSE ANESTHETIST, CERTIFIED REGISTERED

## 2023-07-06 PROCEDURE — 37000009 HC ANESTHESIA EA ADD 15 MINS: Performed by: INTERNAL MEDICINE

## 2023-07-06 PROCEDURE — 43239 EGD BIOPSY SINGLE/MULTIPLE: CPT | Performed by: INTERNAL MEDICINE

## 2023-07-06 PROCEDURE — 27201012 HC FORCEPS, HOT/COLD, DISP: Performed by: INTERNAL MEDICINE

## 2023-07-06 PROCEDURE — 88342 IMHCHEM/IMCYTCHM 1ST ANTB: CPT | Mod: 26,,, | Performed by: PATHOLOGY

## 2023-07-06 PROCEDURE — D9220A PRA ANESTHESIA: Mod: ,,, | Performed by: NURSE ANESTHETIST, CERTIFIED REGISTERED

## 2023-07-06 RX ORDER — SODIUM CHLORIDE 0.9 % (FLUSH) 0.9 %
10 SYRINGE (ML) INJECTION
Status: DISCONTINUED | OUTPATIENT
Start: 2023-07-06 | End: 2023-07-06 | Stop reason: HOSPADM

## 2023-07-06 RX ORDER — PROPOFOL 10 MG/ML
VIAL (ML) INTRAVENOUS
Status: DISCONTINUED | OUTPATIENT
Start: 2023-07-06 | End: 2023-07-06

## 2023-07-06 RX ORDER — DIPHENHYDRAMINE HYDROCHLORIDE 50 MG/ML
12.5 INJECTION INTRAMUSCULAR; INTRAVENOUS EVERY 30 MIN PRN
Status: DISCONTINUED | OUTPATIENT
Start: 2023-07-06 | End: 2023-07-06 | Stop reason: HOSPADM

## 2023-07-06 RX ORDER — LIDOCAINE HYDROCHLORIDE 20 MG/ML
INJECTION INTRAVENOUS
Status: DISCONTINUED | OUTPATIENT
Start: 2023-07-06 | End: 2023-07-06

## 2023-07-06 RX ORDER — PROCHLORPERAZINE EDISYLATE 5 MG/ML
5 INJECTION INTRAMUSCULAR; INTRAVENOUS EVERY 30 MIN PRN
Status: DISCONTINUED | OUTPATIENT
Start: 2023-07-06 | End: 2023-07-06 | Stop reason: HOSPADM

## 2023-07-06 RX ORDER — SODIUM CHLORIDE 0.9 % (FLUSH) 0.9 %
3 SYRINGE (ML) INJECTION
Status: DISCONTINUED | OUTPATIENT
Start: 2023-07-06 | End: 2023-07-06 | Stop reason: HOSPADM

## 2023-07-06 RX ADMIN — PROPOFOL 100 MG: 10 INJECTION, EMULSION INTRAVENOUS at 08:07

## 2023-07-06 RX ADMIN — SODIUM CHLORIDE, SODIUM LACTATE, POTASSIUM CHLORIDE, AND CALCIUM CHLORIDE: .6; .31; .03; .02 INJECTION, SOLUTION INTRAVENOUS at 08:07

## 2023-07-06 RX ADMIN — LIDOCAINE HYDROCHLORIDE 60 MG: 20 INJECTION, SOLUTION INTRAVENOUS at 08:07

## 2023-07-06 RX ADMIN — PROPOFOL 40 MG: 10 INJECTION, EMULSION INTRAVENOUS at 08:07

## 2023-07-06 NOTE — H&P
"Endoscopy Pre-Procedure H&P    Reason for Procedure: dysphagia    HPI:  Pt is a 42 y.o. female who presents for EGD to evaluate dysphagia.  Had MBSS.  No other GI symptoms.    Past Medical History:   Diagnosis Date    Alcohol use, unspecified with unspecified alcohol-induced disorder     Depression     Dysphagia     Hypertension     Hypothyroidism, unspecified     Seizures        Past Surgical History:   Procedure Laterality Date    brain Surgery      TUBAL LIGATION         History reviewed. No pertinent family history.    Social History     Tobacco Use    Smoking status: Former     Packs/day: 1.00     Years: 15.00     Pack years: 15.00     Types: Cigarettes    Smokeless tobacco: Former   Substance Use Topics    Alcohol use: Not Currently       Review of patient's allergies indicates:   Allergen Reactions    Percocet [oxycodone-acetaminophen] Hives       No current facility-administered medications on file prior to encounter.     Current Outpatient Medications on File Prior to Encounter   Medication Sig Dispense Refill    amLODIPine (NORVASC) 10 MG tablet Take 10 mg by mouth.      levETIRAcetam (KEPPRA) 1000 MG tablet Take 1 tablet (1,000 mg total) by mouth 2 (two) times daily. 60 tablet 0    levothyroxine (SYNTHROID) 25 MCG tablet Take 25 mcg by mouth.      ondansetron (ZOFRAN-ODT) 4 MG TbDL Take 1 tablet (4 mg total) by mouth every 6 (six) hours as needed. 15 tablet 0    sertraline (ZOLOFT) 25 MG tablet Take 25 mg by mouth.         ROS: Negative x 10    Patient Vitals for the past 24 hrs:   BP Temp Temp src Pulse Resp SpO2 Height Weight   07/06/23 0618 120/81 98.1 °F (36.7 °C) Oral 64 18 97 % 4' 9" (1.448 m) 43.1 kg (95 lb 0.3 oz)     Gen: Well developed, well nourished, no apparent distress  HEENT: Anicteric, PERRL, MMM  CV: Regular rate and rhythm, no murmurs   Lungs: CTAB, no increased work of breathing  Abd: Soft, NT, ND, normal BS, no HSM  Ext: No C/C/E  Neuro: Alert and oriented to person, place, time; no " weakness  Skin: No rashes/lesions  Psych: Normal mood and affect    Assessment:  Harvey Gould is a 42 y.o. female who presents for EGD to evaluate dysphagia.    Recommendations:  1. EGD  2. F/U with PCP     Carol Alfonso MD       (M6) obeys commands

## 2023-07-06 NOTE — ANESTHESIA PREPROCEDURE EVALUATION
07/06/2023  Harvey Gould is a 42 y.o., female.      Pre-op Assessment    I have reviewed the Patient Summary Reports.     I have reviewed the Nursing Notes. I have reviewed the NPO Status.   I have reviewed the Medications.     Review of Systems  Anesthesia Hx:  Denies Family Hx of Anesthesia complications.   Denies Personal Hx of Anesthesia complications.   Social:  Former Smoker    Hematology/Oncology:  Hematology Normal   Oncology Normal     EENT/Dental:EENT/Dental Normal   Cardiovascular:   Hypertension    Pulmonary:  Pulmonary Normal    Renal/:  Renal/ Normal     Hepatic/GI:  Hepatic/GI Normal    Musculoskeletal:  Musculoskeletal Normal    Neurological:   CVA, residual symptoms Seizures, well controlled 9/22 intracranial hemorrhage    Resident at [facility]    Severe cognitive dysfunction, hx from pt's sister    L sided weakness, progressive to R side, unable to ambulate    Dysphagia, abnormal swallow study for EGD   Endocrine:   Hypothyroidism    Dermatological:  Skin Normal    Psych:   Psychiatric History          Physical Exam  General: Cooperative, Alert and Flat Affect    Airway:  TM Distance: Normal  Neck ROM: Normal ROM    Dental:  Intact        Anesthesia Plan  Type of Anesthesia, risks & benefits discussed:    Anesthesia Type: Gen Natural Airway  Intra-op Monitoring Plan: Standard ASA Monitors  Post Op Pain Control Plan: multimodal analgesia  Informed Consent: Informed consent signed with the Patient representative and all parties understand the risks and agree with anesthesia plan.  All questions answered.   ASA Score: 3  Anesthesia Plan Notes: Hx and consent from pt's sister    Ready For Surgery From Anesthesia Perspective.     .

## 2023-07-06 NOTE — [employer] PATIENT INSTRUCTIONS
Discharge Summary/Instructions after an Endoscopic Procedure  Patient Name: Harvey Gould  Patient MRN: [id number]  Patient YOB: 1980  Thursday, July 6, 2023  Carol Alfonso MD  RESTRICTIONS:  During your procedure today, you received medications for sedation.  These   medications may affect your judgment, balance and coordination.  Therefore,   for 24 hours, you have the following restrictions:   - DO NOT drive a car, operate machinery, make legal/financial decisions,   sign important papers or drink alcohol.    ACTIVITY:  Today: no heavy lifting, straining or running due to procedural   sedation/anesthesia.  The following day: return to full activity including work.  DIET:  Eat and drink normally unless instructed otherwise.     TREATMENT FOR COMMON SIDE EFFECTS:  - Mild abdominal pain, nausea, belching, bloating or excessive gas:  rest,   eat lightly and use a heating pad.  - Sore Throat: treat with throat lozenges and/or gargle with warm salt   water.  - Because air was used during the procedure, expelling large amounts of air   from your rectum or belching is normal.  - If a bowel prep was taken, you may not have a bowel movement for 1-3 days.    This is normal.  SYMPTOMS TO WATCH FOR AND REPORT TO YOUR PHYSICIAN:  1. Abdominal pain or bloating, other than gas cramps.  2. Chest pain.  3. Back pain.  4. Signs of infection such as: chills or fever occurring within 24 hours   after the procedure.  5. Rectal bleeding, which would show as bright red, maroon, or black stools.   (A tablespoon of blood from the rectum is not serious, especially if   hemorrhoids are present.)  6. Vomiting.  7. Weakness or dizziness.  GO DIRECTLY TO THE NEAREST EMERGENCY ROOM IF YOU HAVE ANY OF THE FOLLOWING:      Difficulty breathing              Chills and/or fever over 101 F   Persistent vomiting and/or vomiting blood   Severe abdominal pain   Severe chest pain   Black, tarry stools   Bleeding- more than one tablespoon   Any other  symptom or condition that you feel may need urgent attention  Your doctor recommends these additional instructions:  If any biopsies were taken, your doctors clinic will contact you in 1 to 2   weeks with any results.  - Patient has a contact number available for emergencies.  The signs and   symptoms of potential delayed complications were discussed with the   patient.  Return to normal activities tomorrow.  Written discharge   instructions were provided to the patient.   - Discharge patient to home.   - Resume previous diet today.   - Follow an antireflux regimen.   - Continue present medications.   - Await pathology results. Treat for H. pylori if positive.  - Return to GI clinic in 2 weeks.   - The findings and recommendations were discussed with the patient.  For questions, problems or results please call your physician - Carol Alfonso MD at Work:  [phone].  [facility], EMERGENCY ROOM PHONE NUMBER: [phone], [facility]   [phone].  IF A COMPLICATION OR EMERGENCY SITUATION ARISES AND YOU ARE UNABLE TO REACH   YOUR PHYSICIAN - GO DIRECTLY TO THE EMERGENCY ROOM.  Carol Alfonso MD  7/6/2023 8:36:41 AM  This report has been verified and signed electronically.  Dear patient,  As a result of recent federal legislation (The Federal Cures Act), you may   receive lab or pathology results from your procedure in your [facility]   account before your physician is able to contact you. Your physician or   their representative will relay the results to you with their   recommendations at their soonest availability.  Thank you,  [employer]

## 2023-07-06 NOTE — ANESTHESIA POSTPROCEDURE EVALUATION
Anesthesia Post Evaluation    Patient: Harvey Gould    Procedure(s) Performed: Procedure(s) (LRB):  EGD (ESOPHAGOGASTRODUODENOSCOPY) (N/A)    Final Anesthesia Type: general      Patient location during evaluation: [facility]  Patient participation: Yes- Able to Participate  Level of consciousness: awake and alert  Post-procedure vital signs: reviewed and stable  Pain management: adequate  Airway patency: patent    PONV status at discharge: No PONV  Anesthetic complications: no      Cardiovascular status: blood pressure returned to baseline and hemodynamically stable  Respiratory status: unassisted, spontaneous ventilation and room air  Hydration status: euvolemic  Follow-up not needed.          Vitals Value    /62    Temp 36.7 °C (98.1 °F)    Pulse 78    Resp 20    SpO2 98 %          No case tracking events are documented in the log.      Pain/Meagan Score: No data recorded

## 2023-07-06 NOTE — PLAN OF CARE
Harvey Gould has met all discharge criteria from Phase II. Vital Signs are stable. Discharge instructions given, patient verbalized understanding. Discharged from facility via wheelchair to transport van in stable condition. Attempted report to [facility] nurse accepting patient.

## 2023-07-11 LAB
FINAL PATHOLOGIC DIAGNOSIS: NORMAL
GROSS: NORMAL
Lab: NORMAL

## 2023-12-07 ENCOUNTER — HOSPITAL ENCOUNTER (INPATIENT)
Facility: HOSPITAL | Age: 43
LOS: 4 days | Discharge: HOME | DRG: 101 | End: 2023-12-11
Attending: STUDENT IN AN ORGANIZED HEALTH CARE EDUCATION/TRAINING PROGRAM | Admitting: INTERNAL MEDICINE
Payer: MEDICARE

## 2023-12-07 DIAGNOSIS — R56.9 SEIZURE: Primary | ICD-10-CM

## 2023-12-07 DIAGNOSIS — R06.00 DYSPNEA: ICD-10-CM

## 2023-12-07 DIAGNOSIS — R29.818 ACUTE FOCAL NEUROLOGICAL DEFICIT: ICD-10-CM

## 2023-12-07 DIAGNOSIS — Z01.89 ENCOUNTER FOR IMAGING TO SCREEN FOR METAL PRIOR TO MRI: ICD-10-CM

## 2023-12-07 PROBLEM — I10 ESSENTIAL HYPERTENSION: Status: ACTIVE | Noted: 2023-12-07

## 2023-12-07 PROBLEM — R41.89 UNRESPONSIVENESS: Status: ACTIVE | Noted: 2023-12-07

## 2023-12-07 PROBLEM — F32.A DEPRESSION: Status: ACTIVE | Noted: 2023-12-07

## 2023-12-07 PROBLEM — E03.9 HYPOTHYROIDISM: Status: ACTIVE | Noted: 2023-12-07

## 2023-12-07 LAB
ALBUMIN SERPL BCP-MCNC: 4.3 G/DL (ref 3.5–5.2)
ALLENS TEST: ABNORMAL
ALP SERPL-CCNC: 134 U/L (ref 55–135)
ALT SERPL W/O P-5'-P-CCNC: 32 U/L (ref 10–44)
AMPHET+METHAMPHET UR QL: NEGATIVE
ANION GAP SERPL CALC-SCNC: 15 MMOL/L (ref 8–16)
APAP SERPL-MCNC: <3 UG/ML (ref 10–20)
AST SERPL-CCNC: 36 U/L (ref 10–40)
BACTERIA #/AREA URNS AUTO: ABNORMAL /HPF
BARBITURATES UR QL SCN>200 NG/ML: NEGATIVE
BASOPHILS # BLD AUTO: 0.07 K/UL (ref 0–0.2)
BASOPHILS NFR BLD: 0.7 % (ref 0–1.9)
BENZODIAZ UR QL SCN>200 NG/ML: ABNORMAL
BILIRUB SERPL-MCNC: 0.4 MG/DL (ref 0.1–1)
BILIRUB UR QL STRIP: NEGATIVE
BUN SERPL-MCNC: 8 MG/DL (ref 6–20)
BZE UR QL SCN: NEGATIVE
CALCIUM SERPL-MCNC: 9.8 MG/DL (ref 8.7–10.5)
CANNABINOIDS UR QL SCN: NEGATIVE
CHLORIDE SERPL-SCNC: 106 MMOL/L (ref 95–110)
CHOLEST SERPL-MCNC: 211 MG/DL (ref 120–199)
CHOLEST/HDLC SERPL: 3 {RATIO} (ref 2–5)
CLARITY UR REFRACT.AUTO: CLEAR
CO2 SERPL-SCNC: 22 MMOL/L (ref 23–29)
COLOR UR AUTO: YELLOW
CREAT SERPL-MCNC: 0.7 MG/DL (ref 0.5–1.4)
CREAT SERPL-MCNC: 0.8 MG/DL (ref 0.5–1.4)
CREAT UR-MCNC: 67 MG/DL (ref 15–325)
DELSYS: ABNORMAL
DIFFERENTIAL METHOD: ABNORMAL
EOSINOPHIL # BLD AUTO: 0.2 K/UL (ref 0–0.5)
EOSINOPHIL NFR BLD: 1.5 % (ref 0–8)
ERYTHROCYTE [DISTWIDTH] IN BLOOD BY AUTOMATED COUNT: 12.4 % (ref 11.5–14.5)
ERYTHROCYTE [SEDIMENTATION RATE] IN BLOOD BY WESTERGREN METHOD: 18 MM/H
EST. GFR  (NO RACE VARIABLE): >60 ML/MIN/1.73 M^2
ETHANOL SERPL-MCNC: <10 MG/DL
FIO2: 100
GLUCOSE SERPL-MCNC: 137 MG/DL (ref 70–110)
GLUCOSE UR QL STRIP: NEGATIVE
HCO3 UR-SCNC: 23.7 MMOL/L (ref 24–28)
HCO3 UR-SCNC: 26.4 MMOL/L (ref 24–28)
HCO3 UR-SCNC: 27.6 MMOL/L (ref 24–28)
HCT VFR BLD AUTO: 39.8 % (ref 37–48.5)
HDLC SERPL-MCNC: 70 MG/DL (ref 40–75)
HDLC SERPL: 33.2 % (ref 20–50)
HGB BLD-MCNC: 13.9 G/DL (ref 12–16)
HGB UR QL STRIP: NEGATIVE
HYALINE CASTS UR QL AUTO: 19 /LPF
IMM GRANULOCYTES # BLD AUTO: 0.07 K/UL (ref 0–0.04)
IMM GRANULOCYTES NFR BLD AUTO: 0.7 % (ref 0–0.5)
INR PPP: 1 (ref 0.8–1.2)
KETONES UR QL STRIP: NEGATIVE
LDH SERPL L TO P-CCNC: 2.92 MMOL/L (ref 0.5–2.2)
LDLC SERPL CALC-MCNC: 106 MG/DL (ref 63–159)
LEUKOCYTE ESTERASE UR QL STRIP: NEGATIVE
LYMPHOCYTES # BLD AUTO: 1.7 K/UL (ref 1–4.8)
LYMPHOCYTES NFR BLD: 17 % (ref 18–48)
MCH RBC QN AUTO: 29.4 PG (ref 27–31)
MCHC RBC AUTO-ENTMCNC: 34.9 G/DL (ref 32–36)
MCV RBC AUTO: 84 FL (ref 82–98)
METHADONE UR QL SCN>300 NG/ML: NEGATIVE
MICROSCOPIC COMMENT: ABNORMAL
MODE: ABNORMAL
MONOCYTES # BLD AUTO: 0.4 K/UL (ref 0.3–1)
MONOCYTES NFR BLD: 3.4 % (ref 4–15)
NEUTROPHILS # BLD AUTO: 7.8 K/UL (ref 1.8–7.7)
NEUTROPHILS NFR BLD: 76.7 % (ref 38–73)
NITRITE UR QL STRIP: NEGATIVE
NONHDLC SERPL-MCNC: 141 MG/DL
NRBC BLD-RTO: 0 /100 WBC
OPIATES UR QL SCN: NEGATIVE
PCO2 BLDA: 34.7 MMHG (ref 35–45)
PCO2 BLDA: 37.2 MMHG (ref 35–45)
PCO2 BLDA: 71 MMHG (ref 35–45)
PCP UR QL SCN>25 NG/ML: NEGATIVE
PEEP: 5
PH SMN: 7.2 [PH] (ref 7.35–7.45)
PH SMN: 7.41 [PH] (ref 7.35–7.45)
PH SMN: 7.49 [PH] (ref 7.35–7.45)
PH UR STRIP: 6 [PH] (ref 5–8)
PLATELET # BLD AUTO: 263 K/UL (ref 150–450)
PMV BLD AUTO: 9.6 FL (ref 9.2–12.9)
PO2 BLDA: 153 MMHG (ref 80–100)
PO2 BLDA: 638 MMHG (ref 80–100)
PO2 BLDA: 84 MMHG (ref 40–60)
POC BE: -1 MMOL/L
POC BE: -1 MMOL/L
POC BE: 3 MMOL/L
POC PTINR: 1.1 (ref 0.9–1.2)
POC PTWBT: 12.8 SEC (ref 9.7–14.3)
POC SATURATED O2: 100 % (ref 95–100)
POC SATURATED O2: 100 % (ref 95–100)
POC SATURATED O2: 93 % (ref 95–100)
POC TCO2: 25 MMOL/L (ref 23–27)
POC TCO2: 27 MMOL/L (ref 23–27)
POC TCO2: 30 MMOL/L (ref 24–29)
POCT GLUCOSE: 136 MG/DL (ref 70–110)
POTASSIUM SERPL-SCNC: 3.6 MMOL/L (ref 3.5–5.1)
PROT SERPL-MCNC: 8.8 G/DL (ref 6–8.4)
PROT UR QL STRIP: ABNORMAL
PROTHROMBIN TIME: 10.9 SEC (ref 9–12.5)
RBC # BLD AUTO: 4.72 M/UL (ref 4–5.4)
RBC #/AREA URNS AUTO: 2 /HPF (ref 0–4)
SALICYLATES SERPL-MCNC: <5 MG/DL (ref 15–30)
SAMPLE: ABNORMAL
SAMPLE: NORMAL
SAMPLE: NORMAL
SITE: ABNORMAL
SODIUM SERPL-SCNC: 143 MMOL/L (ref 136–145)
SP GR UR STRIP: 1.02 (ref 1–1.03)
SQUAMOUS #/AREA URNS AUTO: 1 /HPF
T4 FREE SERPL-MCNC: 0.93 NG/DL (ref 0.71–1.51)
TOXICOLOGY INFORMATION: ABNORMAL
TRIGL SERPL-MCNC: 175 MG/DL (ref 30–150)
TSH SERPL DL<=0.005 MIU/L-ACNC: 6.5 UIU/ML (ref 0.4–4)
URN SPEC COLLECT METH UR: ABNORMAL
VT: 350
WBC # BLD AUTO: 10.17 K/UL (ref 3.9–12.7)
WBC #/AREA URNS AUTO: 2 /HPF (ref 0–5)

## 2023-12-07 PROCEDURE — 99223 PR INITIAL HOSPITAL CARE,LEVL III: ICD-10-PCS | Mod: GC,,, | Performed by: STUDENT IN AN ORGANIZED HEALTH CARE EDUCATION/TRAINING PROGRAM

## 2023-12-07 PROCEDURE — 80053 COMPREHEN METABOLIC PANEL: CPT | Performed by: STUDENT IN AN ORGANIZED HEALTH CARE EDUCATION/TRAINING PROGRAM

## 2023-12-07 PROCEDURE — 63600175 PHARM REV CODE 636 W HCPCS: Performed by: STUDENT IN AN ORGANIZED HEALTH CARE EDUCATION/TRAINING PROGRAM

## 2023-12-07 PROCEDURE — 93010 ELECTROCARDIOGRAM REPORT: CPT | Mod: ,,, | Performed by: INTERNAL MEDICINE

## 2023-12-07 PROCEDURE — 96360 HYDRATION IV INFUSION INIT: CPT | Mod: 59

## 2023-12-07 PROCEDURE — 80061 LIPID PANEL: CPT | Performed by: STUDENT IN AN ORGANIZED HEALTH CARE EDUCATION/TRAINING PROGRAM

## 2023-12-07 PROCEDURE — 25500020 PHARM REV CODE 255: Performed by: STUDENT IN AN ORGANIZED HEALTH CARE EDUCATION/TRAINING PROGRAM

## 2023-12-07 PROCEDURE — 85025 COMPLETE CBC W/AUTO DIFF WBC: CPT | Performed by: STUDENT IN AN ORGANIZED HEALTH CARE EDUCATION/TRAINING PROGRAM

## 2023-12-07 PROCEDURE — 25000003 PHARM REV CODE 250: Performed by: STUDENT IN AN ORGANIZED HEALTH CARE EDUCATION/TRAINING PROGRAM

## 2023-12-07 PROCEDURE — 63600175 PHARM REV CODE 636 W HCPCS

## 2023-12-07 PROCEDURE — 99900026 HC AIRWAY MAINTENANCE (STAT)

## 2023-12-07 PROCEDURE — 25000003 PHARM REV CODE 250

## 2023-12-07 PROCEDURE — 99291 PR CRITICAL CARE, E/M 30-74 MINUTES: ICD-10-PCS | Mod: FS,,, | Performed by: INTERNAL MEDICINE

## 2023-12-07 PROCEDURE — 96375 TX/PRO/DX INJ NEW DRUG ADDON: CPT

## 2023-12-07 PROCEDURE — 63600175 PHARM REV CODE 636 W HCPCS: Performed by: REGISTERED NURSE

## 2023-12-07 PROCEDURE — 99900035 HC TECH TIME PER 15 MIN (STAT)

## 2023-12-07 PROCEDURE — 51702 INSERT TEMP BLADDER CATH: CPT

## 2023-12-07 PROCEDURE — C9254 INJECTION, LACOSAMIDE: HCPCS | Performed by: REGISTERED NURSE

## 2023-12-07 PROCEDURE — 36600 WITHDRAWAL OF ARTERIAL BLOOD: CPT

## 2023-12-07 PROCEDURE — 25000003 PHARM REV CODE 250: Performed by: REGISTERED NURSE

## 2023-12-07 PROCEDURE — 83605 ASSAY OF LACTIC ACID: CPT

## 2023-12-07 PROCEDURE — 94761 N-INVAS EAR/PLS OXIMETRY MLT: CPT

## 2023-12-07 PROCEDURE — 99285 EMERGENCY DEPT VISIT HI MDM: CPT | Mod: 25

## 2023-12-07 PROCEDURE — 96365 THER/PROPH/DIAG IV INF INIT: CPT

## 2023-12-07 PROCEDURE — 82565 ASSAY OF CREATININE: CPT

## 2023-12-07 PROCEDURE — 94002 VENT MGMT INPAT INIT DAY: CPT

## 2023-12-07 PROCEDURE — 27100171 HC OXYGEN HIGH FLOW UP TO 24 HOURS

## 2023-12-07 PROCEDURE — 85610 PROTHROMBIN TIME: CPT

## 2023-12-07 PROCEDURE — 87040 BLOOD CULTURE FOR BACTERIA: CPT | Performed by: STUDENT IN AN ORGANIZED HEALTH CARE EDUCATION/TRAINING PROGRAM

## 2023-12-07 PROCEDURE — 99223 1ST HOSP IP/OBS HIGH 75: CPT | Mod: GC,,, | Performed by: STUDENT IN AN ORGANIZED HEALTH CARE EDUCATION/TRAINING PROGRAM

## 2023-12-07 PROCEDURE — 80179 DRUG ASSAY SALICYLATE: CPT | Performed by: STUDENT IN AN ORGANIZED HEALTH CARE EDUCATION/TRAINING PROGRAM

## 2023-12-07 PROCEDURE — 82803 BLOOD GASES ANY COMBINATION: CPT

## 2023-12-07 PROCEDURE — 93005 ELECTROCARDIOGRAM TRACING: CPT

## 2023-12-07 PROCEDURE — 95720 PR EEG, W/VIDEO, CONT RECORD, I&R, >12<26 HRS: ICD-10-PCS | Mod: ,,, | Performed by: PSYCHIATRY & NEUROLOGY

## 2023-12-07 PROCEDURE — 82962 GLUCOSE BLOOD TEST: CPT

## 2023-12-07 PROCEDURE — 99291 CRITICAL CARE FIRST HOUR: CPT | Mod: FS,,, | Performed by: INTERNAL MEDICINE

## 2023-12-07 PROCEDURE — 84439 ASSAY OF FREE THYROXINE: CPT | Performed by: STUDENT IN AN ORGANIZED HEALTH CARE EDUCATION/TRAINING PROGRAM

## 2023-12-07 PROCEDURE — 80143 DRUG ASSAY ACETAMINOPHEN: CPT | Performed by: STUDENT IN AN ORGANIZED HEALTH CARE EDUCATION/TRAINING PROGRAM

## 2023-12-07 PROCEDURE — 81001 URINALYSIS AUTO W/SCOPE: CPT | Mod: XB | Performed by: STUDENT IN AN ORGANIZED HEALTH CARE EDUCATION/TRAINING PROGRAM

## 2023-12-07 PROCEDURE — 31500 INSERT EMERGENCY AIRWAY: CPT

## 2023-12-07 PROCEDURE — 96366 THER/PROPH/DIAG IV INF ADDON: CPT

## 2023-12-07 PROCEDURE — 85610 PROTHROMBIN TIME: CPT | Performed by: STUDENT IN AN ORGANIZED HEALTH CARE EDUCATION/TRAINING PROGRAM

## 2023-12-07 PROCEDURE — 12000002 HC ACUTE/MED SURGE SEMI-PRIVATE ROOM

## 2023-12-07 PROCEDURE — 25000003 PHARM REV CODE 250: Performed by: PHYSICIAN ASSISTANT

## 2023-12-07 PROCEDURE — 80307 DRUG TEST PRSMV CHEM ANLYZR: CPT | Performed by: STUDENT IN AN ORGANIZED HEALTH CARE EDUCATION/TRAINING PROGRAM

## 2023-12-07 PROCEDURE — 84443 ASSAY THYROID STIM HORMONE: CPT | Performed by: STUDENT IN AN ORGANIZED HEALTH CARE EDUCATION/TRAINING PROGRAM

## 2023-12-07 PROCEDURE — 93010 EKG 12-LEAD: ICD-10-PCS | Mod: ,,, | Performed by: INTERNAL MEDICINE

## 2023-12-07 PROCEDURE — 95720 EEG PHY/QHP EA INCR W/VEEG: CPT | Mod: ,,, | Performed by: PSYCHIATRY & NEUROLOGY

## 2023-12-07 PROCEDURE — 82077 ASSAY SPEC XCP UR&BREATH IA: CPT | Performed by: STUDENT IN AN ORGANIZED HEALTH CARE EDUCATION/TRAINING PROGRAM

## 2023-12-07 RX ORDER — FENTANYL CITRATE-0.9 % NACL/PF 10 MCG/ML
0-250 PLASTIC BAG, INJECTION (ML) INTRAVENOUS CONTINUOUS
Status: DISCONTINUED | OUTPATIENT
Start: 2023-12-07 | End: 2023-12-07

## 2023-12-07 RX ORDER — DEXMEDETOMIDINE HYDROCHLORIDE 4 UG/ML
0-1.4 INJECTION, SOLUTION INTRAVENOUS CONTINUOUS
Status: DISCONTINUED | OUTPATIENT
Start: 2023-12-07 | End: 2023-12-08

## 2023-12-07 RX ORDER — SODIUM CHLORIDE 0.9 % (FLUSH) 0.9 %
10 SYRINGE (ML) INJECTION
Status: DISCONTINUED | OUTPATIENT
Start: 2023-12-07 | End: 2023-12-11 | Stop reason: HOSPADM

## 2023-12-07 RX ORDER — LACTULOSE 10 G/15ML
10 SOLUTION ORAL; RECTAL 3 TIMES DAILY
Status: ON HOLD | COMMUNITY
End: 2023-12-11 | Stop reason: SDUPTHER

## 2023-12-07 RX ORDER — LEVOTHYROXINE SODIUM 25 UG/1
25 TABLET ORAL
Status: DISCONTINUED | OUTPATIENT
Start: 2023-12-08 | End: 2023-12-11 | Stop reason: HOSPADM

## 2023-12-07 RX ORDER — ETOMIDATE 2 MG/ML
20 INJECTION INTRAVENOUS
Status: COMPLETED | OUTPATIENT
Start: 2023-12-07 | End: 2023-12-07

## 2023-12-07 RX ORDER — FAMOTIDINE 10 MG/ML
20 INJECTION INTRAVENOUS EVERY 12 HOURS
Status: DISCONTINUED | OUTPATIENT
Start: 2023-12-07 | End: 2023-12-07

## 2023-12-07 RX ORDER — SODIUM CHLORIDE 9 MG/ML
INJECTION, SOLUTION INTRAVENOUS CONTINUOUS
Status: DISCONTINUED | OUTPATIENT
Start: 2023-12-07 | End: 2023-12-07

## 2023-12-07 RX ORDER — GABAPENTIN 300 MG/1
300 CAPSULE ORAL 2 TIMES DAILY
Status: DISCONTINUED | OUTPATIENT
Start: 2023-12-07 | End: 2023-12-11 | Stop reason: HOSPADM

## 2023-12-07 RX ORDER — HYDRALAZINE HYDROCHLORIDE 20 MG/ML
10 INJECTION INTRAMUSCULAR; INTRAVENOUS EVERY 4 HOURS PRN
Status: DISCONTINUED | OUTPATIENT
Start: 2023-12-07 | End: 2023-12-08

## 2023-12-07 RX ORDER — SERTRALINE HYDROCHLORIDE 25 MG/1
25 TABLET, FILM COATED ORAL DAILY
Status: DISCONTINUED | OUTPATIENT
Start: 2023-12-08 | End: 2023-12-11 | Stop reason: HOSPADM

## 2023-12-07 RX ORDER — PROPOFOL 10 MG/ML
0-50 INJECTION, EMULSION INTRAVENOUS CONTINUOUS
Status: DISCONTINUED | OUTPATIENT
Start: 2023-12-07 | End: 2023-12-07

## 2023-12-07 RX ORDER — AMLODIPINE BESYLATE 10 MG/1
10 TABLET ORAL DAILY
Status: DISCONTINUED | OUTPATIENT
Start: 2023-12-08 | End: 2023-12-11 | Stop reason: HOSPADM

## 2023-12-07 RX ORDER — ONDANSETRON 2 MG/ML
4 INJECTION INTRAMUSCULAR; INTRAVENOUS EVERY 8 HOURS PRN
Status: DISCONTINUED | OUTPATIENT
Start: 2023-12-07 | End: 2023-12-11 | Stop reason: HOSPADM

## 2023-12-07 RX ORDER — PROPOFOL 10 MG/ML
INJECTION, EMULSION INTRAVENOUS
Status: COMPLETED
Start: 2023-12-07 | End: 2023-12-07

## 2023-12-07 RX ORDER — ROCURONIUM BROMIDE 10 MG/ML
INJECTION, SOLUTION INTRAVENOUS
Status: COMPLETED
Start: 2023-12-07 | End: 2023-12-07

## 2023-12-07 RX ORDER — LABETALOL HCL 20 MG/4 ML
10 SYRINGE (ML) INTRAVENOUS EVERY 4 HOURS PRN
Status: DISCONTINUED | OUTPATIENT
Start: 2023-12-07 | End: 2023-12-08

## 2023-12-07 RX ORDER — ROCURONIUM BROMIDE 10 MG/ML
70 INJECTION, SOLUTION INTRAVENOUS ONCE
Status: COMPLETED | OUTPATIENT
Start: 2023-12-07 | End: 2023-12-07

## 2023-12-07 RX ORDER — ETOMIDATE 2 MG/ML
INJECTION INTRAVENOUS
Status: COMPLETED
Start: 2023-12-07 | End: 2023-12-07

## 2023-12-07 RX ORDER — LACOSAMIDE 100 MG/1
100 TABLET ORAL EVERY 12 HOURS
Status: ON HOLD | COMMUNITY
End: 2023-12-11 | Stop reason: HOSPADM

## 2023-12-07 RX ORDER — FENTANYL CITRATE 50 UG/ML
100 INJECTION, SOLUTION INTRAMUSCULAR; INTRAVENOUS
Status: DISCONTINUED | OUTPATIENT
Start: 2023-12-07 | End: 2023-12-07

## 2023-12-07 RX ORDER — MEMANTINE HYDROCHLORIDE 5 MG/1
5 TABLET ORAL DAILY
COMMUNITY

## 2023-12-07 RX ORDER — LEVETIRACETAM 500 MG/5ML
2000 INJECTION, SOLUTION, CONCENTRATE INTRAVENOUS
Status: COMPLETED | OUTPATIENT
Start: 2023-12-07 | End: 2023-12-07

## 2023-12-07 RX ADMIN — Medication 10 MCG/HR: at 02:12

## 2023-12-07 RX ADMIN — PROPOFOL 5 MCG/KG/MIN: 10 INJECTION, EMULSION INTRAVENOUS at 01:12

## 2023-12-07 RX ADMIN — Medication 100 MCG/HR: at 02:12

## 2023-12-07 RX ADMIN — LACOSAMIDE 100 MG: 10 INJECTION INTRAVENOUS at 06:12

## 2023-12-07 RX ADMIN — ONDANSETRON 4 MG: 2 INJECTION INTRAMUSCULAR; INTRAVENOUS at 06:12

## 2023-12-07 RX ADMIN — SODIUM CHLORIDE, POTASSIUM CHLORIDE, SODIUM LACTATE AND CALCIUM CHLORIDE 1000 ML: 600; 310; 30; 20 INJECTION, SOLUTION INTRAVENOUS at 01:12

## 2023-12-07 RX ADMIN — LEVETIRACETAM 2000 MG: 100 INJECTION, SOLUTION INTRAVENOUS at 02:12

## 2023-12-07 RX ADMIN — DEXMEDETOMIDINE HYDROCHLORIDE 0.2 MCG/KG/HR: 4 INJECTION, SOLUTION INTRAVENOUS at 06:12

## 2023-12-07 RX ADMIN — GABAPENTIN 300 MG: 300 CAPSULE ORAL at 10:12

## 2023-12-07 RX ADMIN — IOHEXOL 75 ML: 350 INJECTION, SOLUTION INTRAVENOUS at 01:12

## 2023-12-07 RX ADMIN — ROCURONIUM BROMIDE 70 MG: 10 INJECTION INTRAVENOUS at 12:12

## 2023-12-07 RX ADMIN — ETOMIDATE 20 MG: 2 INJECTION INTRAVENOUS at 12:12

## 2023-12-07 RX ADMIN — FAMOTIDINE 20 MG: 10 INJECTION, SOLUTION INTRAVENOUS at 10:12

## 2023-12-07 RX ADMIN — ROCURONIUM BROMIDE 70 MG: 10 INJECTION, SOLUTION INTRAVENOUS at 12:12

## 2023-12-07 NOTE — ED TRIAGE NOTES
42 y/o F presents to ER with c/c seizures. Pt was found by staff at 1200 to be unresponsive, but had witnessed seizure by EMS staff. Given 5 mg versed IM, 5 mg versed IN, and 5 mg versed IV en route. Pt arrives on 15 lpm O2 via NRB. H/x aneurysm and CVA.

## 2023-12-07 NOTE — HPI
Harvey Gould is a 43 y.o. female with PMH of HTN, seizures, alcohol use disorder, hypothyroidism, former smoker that presents to the ED via EMS for witnessed seizures and concern for status. History obtained from chart review and ED team. LKN 30min PTA per ED provider. Patient reportedly found unresponsive at NH and had witnessed seizure by EMS. Seizure reportedly lasted 10 minutes. Given versed IM, versed IN, and versed IV by EMS en route to ED. While in ED, stroke code activated for concern for status and unequal pupils. Exam limited due to versed. Patient somnolent, no movement in extremities, withdrawing to pain in LUE only, no movement in BLE/RUE, corneal reflexes intact bilaterally. NIHSS 28. Imaging delayed due to patient requiring stabilization and intubation prior to obtaining scans. CTA stroke multiphase unremarkable for evidence of acute hemorrhage, LVO, or critical stenosis. Not candidate for acute interventions with thrombolytics or thrombectomy. MRI brain pending for further stroke evaluation.

## 2023-12-07 NOTE — ED PROVIDER NOTES
Encounter Date: 12/7/2023       History     Chief Complaint   Patient presents with    Seizures     Arrives via EMS with seizure activity coming from [facility], per [facility] pt seizing for 10 minutes, received a total of 15 mg versed - 10 IM and 5 IV pt arrives on face mask 10 L      43-year-old female with history of prior aneurysm and stroke, alcohol use, HTN, depression, and seizures who presents to the ED from [facility] for chief complaint of seizure activity.  EMS was called after the patient had altered mental status.  When EMS arrived at the nursing facility, patient started to have seizure activity.  Patient was seizing for approximately 10 minutes.  She received 10 mg of Versed IM and 5 mg of Versed IV.  Patient arrived on 10 L O2 face mask.    The history is provided by the EMS personnel. No  was used.     Review of patient's allergies indicates:   Allergen Reactions    Percocet [oxycodone-acetaminophen] Hives     Past Medical History:   Diagnosis Date    Alcohol use, unspecified with unspecified alcohol-induced disorder     Depression     Dysphagia     Hypertension     Hypothyroidism, unspecified     Seizures      Past Surgical History:   Procedure Laterality Date    brain Surgery      ESOPHAGOGASTRODUODENOSCOPY N/A 7/6/2023    Procedure: EGD (ESOPHAGOGASTRODUODENOSCOPY);  Surgeon: Carol Alfonso MD;  Location: [facility];  Service: Endoscopy;  Laterality: N/A;    TUBAL LIGATION       History reviewed. No pertinent family history.  Social History     Tobacco Use    Smoking status: Former     Current packs/day: 1.00     Average packs/day: 1 pack/day for 15.0 years (15.0 ttl pk-yrs)     Types: Cigarettes    Smokeless tobacco: Former   Substance Use Topics    Alcohol use: Not Currently     Review of Systems    Physical Exam     Initial Vitals   BP Pulse Resp Temp SpO2   12/07/23 1242 12/07/23 1242 12/07/23 1255 12/07/23 1415 12/07/23 1242   (!) 154/70 (!) 145 18 97.5  °F (36.4 °C) 100 %      MAP       --                Physical Exam    Nursing note and vitals reviewed.  Constitutional: No distress.   Patient is laying on bed unresponsive.   HENT:   Head: Normocephalic.   Mouth/Throat: Oropharynx is clear and moist.   Eyes: Conjunctivae are normal. No scleral icterus.   Left pupil slightly larger than the right, sluggish to light.   Cardiovascular:  Regular rhythm and normal heart sounds.           Tachycardic   Pulmonary/Chest: Breath sounds normal. She is in respiratory distress.   Oxygen mask in place at 10 L   Abdominal: Abdomen is soft. She exhibits no distension. There is no abdominal tenderness. There is no rebound and no guarding.   Musculoskeletal:         General: No edema.     Neurological:   Patient is not alert.   Skin: Skin is warm. Capillary refill takes 2 to 3 seconds.         ED Course   Intubation    Date/Time: 12/7/2023 1:04 PM  Location procedure was performed: [facility] EMERGENCY DEPARTMENT    Performed by: Elijah Ellis MD  Authorized by: Manny Kirkland MD  Assisting provider: Manny Kirkland MD  Consent Done: Emergent Situation  Indications: airway protection and respiratory distress  Intubation method: video-assisted  Preoxygenation: BVM  Sedatives: etomidate  Paralytic: rocuronium  Laryngoscope size: Mac 4  Tube size: 7.5 mm  Tube type: cuffed  Number of attempts: 1  Post-procedure assessment: chest rise and ETCO2 monitor  Breath sounds: clear  Cuff inflated: yes  ETT to lip: 24 cm  Tube secured with: ETT maretl  Chest x-ray interpreted by radiologist.  Chest x-ray findings: endotracheal tube too low  Tube repositioned: tube repositioned successfully  Patient tolerance: Patient tolerated the procedure well with no immediate complications        Labs Reviewed   CBC W/ AUTO DIFFERENTIAL - Abnormal; Notable for the following components:       Result Value    Immature Granulocytes 0.7 (*)     Gran # (ANC) 7.8 (*)     Immature Grans (Abs) 0.07 (*)     Gran %  76.7 (*)     Lymph % 17.0 (*)     Mono % 3.4 (*)     All other components within normal limits   COMPREHENSIVE METABOLIC PANEL - Abnormal; Notable for the following components:    CO2 22 (*)     Glucose 137 (*)     Total Protein 8.8 (*)     All other components within normal limits   TSH - Abnormal; Notable for the following components:    TSH 6.501 (*)     All other components within normal limits   LIPID PANEL - Abnormal; Notable for the following components:    Cholesterol 211 (*)     Triglycerides 175 (*)     All other components within normal limits   SALICYLATE LEVEL - Abnormal; Notable for the following components:    Salicylate Lvl <5.0 (*)     All other components within normal limits   ACETAMINOPHEN LEVEL - Abnormal; Notable for the following components:    Acetaminophen (Tylenol), Serum <3.0 (*)     All other components within normal limits   DRUG SCREEN PANEL, URINE EMERGENCY - Abnormal; Notable for the following components:    Benzodiazepines Presumptive Positive (*)     All other components within normal limits    Narrative:     Specimen Source->Urine   POCT GLUCOSE - Abnormal; Notable for the following components:    POCT Glucose 136 (*)     All other components within normal limits   ISTAT LACTATE - Abnormal; Notable for the following components:    POC Lactate 2.92 (*)     All other components within normal limits   ISTAT PROCEDURE - Abnormal; Notable for the following components:    POC PH 7.197 (*)     POC PCO2 71.0 (*)     POC PO2 84 (*)     POC TCO2 30 (*)     All other components within normal limits   ISTAT PROCEDURE - Abnormal; Notable for the following components:    POC PO2 638 (*)     POC HCO3 23.7 (*)     All other components within normal limits   ISTAT PROCEDURE - Abnormal; Notable for the following components:    POC PH 7.490 (*)     POC PCO2 34.7 (*)     POC PO2 153 (*)     POC BE 3 (*)     All other components within normal limits   CULTURE, BLOOD   CULTURE, BLOOD   PROTIME-INR    ALCOHOL,MEDICAL (ETHANOL)   T4, FREE   URINALYSIS, REFLEX TO URINE CULTURE   POCT GLUCOSE, HAND-HELD DEVICE   ISTAT PROCEDURE   ISTAT CREATININE     EKG Readings: (Independently Interpreted)   EKG shows sinus tachycardia, rate of 129 beats per minute, and no ST elevations.       Imaging Results              X-Ray Chest AP Portable (Final result)  Result time 12/07/23 17:21:00      Final result by Korey Katz MD (12/07/23 17:21:00)                   Impression:      Satisfactory position of the endotracheal tube tip.    Enteric tube tip extends below the field of view.    No airspace disease.      Electronically signed by: Korey Katz MD  Date:    12/07/2023  Time:    17:21               Narrative:    EXAMINATION:  XR CHEST AP PORTABLE    CLINICAL HISTORY:  Unspecified convulsions    TECHNIQUE:  Single frontal view of the chest was performed.    COMPARISON:  12/07/2023.    FINDINGS:  There has been interval retraction of the endotracheal tube.  The tip is 4.4 cm from the kvng.  The enteric tube extends below the left hemidiaphragm.    The cardiomediastinal silhouette is within normal limits.  The hemidiaphragms are unremarkable.  There are no pleural effusions.  There is no evidence of a pneumothorax.  There is no evidence of pneumomediastinum.  No airspace opacity is present.  There are degenerative changes in the osseous structures.                                        CTA STROKE MULTI-PHASE (Final result)  Result time 12/07/23 13:55:16      Final result by Chi Taylor MD (12/07/23 13:55:16)                   Impression:      No evidence of acute hemorrhage or major vascular distribution infarct.    Large region encephalomalacia centered in the right temporal lobe, with overlying craniotomy.  Recommend correlation with clinical history.    Left inferior frontal encephalomalacia, prior TBI.    CT arteriogram demonstrates no evidence of high-grade stenosis or large vessel occlusion.    Endotracheal  tube extending to the right mainstem bronchus with complete atelectasis of the left lung, new from recent radiograph.    Further workup as warranted clinically.    This report was flagged in Epic as abnormal.      Electronically signed by: Chi Taylor MD  Date:    12/07/2023  Time:    13:55               Narrative:    EXAMINATION:  CTA STROKE MULTI-PHASE    CLINICAL HISTORY:  Neuro deficit, acute, stroke suspected;    TECHNIQUE:  Axial CT images obtained throughout the region of the head before the administration of intravenous contrast.    CT angiogram was performed through the cervical and intracranial vasculature during the IV bolus administration of 100mL of Omnipaque 350.  Two additional phases through the intracranial vasculature via multiphase technique.    Multiplanar MPR and MIP reformats were performed.    CT source data was analyzed using artificial intelligence software for detection of a large vessel occlusions (LVO) in order to enable computer assisted triage notification and aid clinical stroke decision making.    COMPARISON:  CT head 01/04/2023    FINDINGS:  Focal encephalomalacia in the inferior left frontal lobe, site of prior traumatic contusion.  Additional larger region of encephalomalacia in the right cerebral hemisphere centered in the anterior temporal lobe, as before.  No new parenchymal hemorrhage, edema or major vascular distribution infarct.    Ventricles are similar in size.  Mild ex vacuo dilatation.  No hydrocephalus.    No extra-axial blood or fluid collections.    Prior right-sided craniotomy.  No new displaced calvarial fracture.  Mastoid air cells and paranasal sinuses are essentially clear.    Endotracheal tube in place.  Tip extending to the right mainstem bronchus.  Centrally complete atelectasis of the left lung.      CTA:    The aortic arch demonstrates no significant stenosis at the major branch vessel origins.    The right common carotid artery is normal in caliber.  No  significant stenosis at the carotid bifurcation.The right internal carotid artery is normal in caliber.    The left common carotid artery is normal in caliber. No significant stenosis at the carotid bifurcation.The left internal carotid artery is normal in caliber.    The right vertebral artery is normal in caliber.    The left vertebral artery is normal in caliber.    Basilar artery is within normal limits without focal abnormality.    The proximal anterior, middle, and posterior cerebral arteries are within normal limits.  No evidence of significant stenosis, focal occlusion, or intracranial aneurysm.    Findings were relayed to the ordering provider (Isael) via the epic secure chat system at approximately 13:50.                                        X-Ray Chest AP Portable (Final result)  Result time 12/07/23 13:48:57      Final result by Rachid Burton MD (12/07/23 13:48:57)                   Impression:      This report was flagged in Epic as abnormal.    As above      Electronically signed by: Rachid Burton MD  Date:    12/07/2023  Time:    13:48               Narrative:    EXAMINATION:  XR CHEST AP PORTABLE    CLINICAL HISTORY:  post-intubation eval;    TECHNIQUE:  Single frontal view of the chest was performed.    COMPARISON:  None    FINDINGS:  Endotracheal tube tip projects at the orifice of the right mainstem bronchus, consider retraction by an additional 3-4 cm as warranted.  Nasogastric tube tip projects over the gastric lumen noting the stomach is distended with gas.  The cardiomediastinal silhouette is not enlarged.  There is no pleural effusion.  The trachea is midline.  The lungs are symmetrically expanded bilaterally with mildly coarse interstitial attenuation accentuated by habitus..  No large focal consolidation seen.  There is no pneumothorax.  The osseous structures are unremarkable.                                       Medications   sodium chloride 0.9% flush 10 mL (has no  administration in time range)   famotidine (PF) injection 20 mg (has no administration in time range)   ondansetron injection 4 mg (4 mg Intravenous Given 12/7/23 1819)   labetalol 20 mg/4 mL (5 mg/mL) IV syring (has no administration in time range)   hydrALAZINE injection 10 mg (has no administration in time range)   levothyroxine tablet 25 mcg (has no administration in time range)   dexmedetomidine (PRECEDEX) 400mcg/100mL 0.9% NaCL infusion (0.2 mcg/kg/hr × 53 kg Intravenous New Bag 12/7/23 1807)   lacosamide (VIMPAT) 100 mg in sodium chloride 0.9% 100 mL IVPB (has no administration in time range)   etomidate injection 20 mg (20 mg Intravenous Given 12/7/23 1247)   rocuronium injection 70 mg (70 mg Intravenous Given 12/7/23 1248)   lactated ringers bolus 1,000 mL (0 mLs Intravenous Stopped 12/7/23 1415)   iohexoL (OMNIPAQUE 350) injection 100 mL (75 mLs Intravenous Given 12/7/23 1342)   levETIRAcetam injection 2,000 mg (2,000 mg Intravenous Given 12/7/23 1441)     Medical Decision Making  43-year-old female who presents unresponsive after a seizure.  She is hypertensive and tachycardic.  Patient had an oxygen mask.  Pupils are sluggish to light and the left pupil appears bigger than the left.  Please see physical exam findings below for additional details.  Differential diagnoses include but are not limited to status epilepticus, ischemic stroke, hemorrhagic stroke, ICH, hypoglycemia, electrolyte abnormality.  A code stroke was called in the vascular Neurology team came to see patient at bedside.  Obtained labs and CT a head multiphase.  Patient was intubated due to airway protection and respiratory distress.  POCT glucose is 136.  POC lactate is 2.92, ordered lactated Ringer's.  Administered Keppra.  Please see ED course for additional details.  Patient was reassessed and appears to be awakening from sedation.  Increased propofol titration and administered fentanyl pushes.  Consulted and discussed patient with  the neuro critical care team.  Ordered an EEG.    Patient will be admitted to the hospital by the neuro critical care team in the setting of her seizure activity and dyspnea.    Amount and/or Complexity of Data Reviewed  Labs: ordered.  Radiology: ordered.  ECG/medicine tests: independent interpretation performed.    Risk  Prescription drug management.  Decision regarding hospitalization.               ED Course as of 12/07/23 1825   Thu Dec 07, 2023   1308 Lactate is 2.9. [MD]   1502 Consulted and discussed patient with neuro critical care team.  Plan for continuous EEG. [MD]      ED Course User Index  [MD] Elijah Ellis MD                           Clinical Impression:  Final diagnoses:  [R29.818] Acute focal neurological deficit  [R06.00] Dyspnea  [R56.9] Seizure          ED Disposition Condition    Admit Stable                Elijah Ellis MD  Resident  12/07/23 1825       Elijah Ellis MD  Resident  12/07/23 1931

## 2023-12-07 NOTE — SUBJECTIVE & OBJECTIVE
Past Medical History:   Diagnosis Date    Alcohol use, unspecified with unspecified alcohol-induced disorder     Depression     Dysphagia     Hypertension     Hypothyroidism, unspecified     Seizures      Past Surgical History:   Procedure Laterality Date    brain Surgery      ESOPHAGOGASTRODUODENOSCOPY N/A 7/6/2023    Procedure: EGD (ESOPHAGOGASTRODUODENOSCOPY);  Surgeon: Carol Alfonso MD;  Location: [facility];  Service: Endoscopy;  Laterality: N/A;    TUBAL LIGATION         History reviewed. No pertinent family history.  Social History     Tobacco Use    Smoking status: Former     Current packs/day: 1.00     Average packs/day: 1 pack/day for 15.0 years (15.0 ttl pk-yrs)     Types: Cigarettes    Smokeless tobacco: Former   Substance Use Topics    Alcohol use: Not Currently     Review of patient's allergies indicates:   Allergen Reactions    Percocet [oxycodone-acetaminophen] Hives       Medications: I have reviewed the current medication administration record.    (Not in a hospital admission)      Review of Systems   Unable to perform ROS: Patient unresponsive     Objective:     Vital Signs (Most Recent):  Temp: 98.4 °F (36.9 °C) (12/07/23 1600)  Pulse: 99 (12/07/23 1600)  Resp: 18 (12/07/23 1600)  BP: 106/71 (12/07/23 1600)  SpO2: 99 % (12/07/23 1600)    Vital Signs Range (Last 24H):  Temp:  [97.3 °F (36.3 °C)-98.6 °F (37 °C)]   Pulse:  []   Resp:  [18-53]   BP: (106-206)/()   SpO2:  [98 %-100 %]        Physical Exam  Vitals and nursing note reviewed.   Constitutional:       General: She is in acute distress.      Appearance: She is ill-appearing.   HENT:      Head: Normocephalic.      Nose: Nose normal.   Eyes:      Conjunctiva/sclera: Conjunctivae normal.   Cardiovascular:      Rate and Rhythm: Tachycardia present.   Musculoskeletal:         General: No deformity.   Skin:     General: Skin is warm.   Neurological:      Mental Status: She is unresponsive.      GCS: GCS eye subscore is 1. GCS  "verbal subscore is 1. GCS motor subscore is 4.      Motor: No seizure activity.      Comments: Exam limited due to unresponsive/sedation with versed. No movement to pain in BLE/RUE, withdraws in LUE. Corneals intact.              Neurological Exam:   LOC: obtunded  Attention Span: poor  Language: Global aphasia  Articulation: Untestable due to severe aphasia   Orientation: Untestable due to severe aphasia   Motor/Sensation: No movement in BLE/RUE, withdraws to pain in LUE      Laboratory:  CMP:   Recent Labs   Lab 12/07/23  1305   CALCIUM 9.8   ALBUMIN 4.3   PROT 8.8*      K 3.6   CO2 22*      BUN 8   CREATININE 0.8   ALKPHOS 134   ALT 32   AST 36   BILITOT 0.4     CBC:   Recent Labs   Lab 12/07/23  1305   WBC 10.17   RBC 4.72   HGB 13.9   HCT 39.8      MCV 84   MCH 29.4   MCHC 34.9     Lipid Panel:   Recent Labs   Lab 12/07/23  1305   CHOL 211*   LDLCALC 106.0   HDL 70   TRIG 175*     Coagulation:   Recent Labs   Lab 12/07/23  1305   INR 1.0     Hgb A1C: No results for input(s): "HGBA1C" in the last 168 hours.  TSH:   Recent Labs   Lab 12/07/23  1305   TSH 6.501*       Diagnostic Results:      Brain/Vessel Imaging:  CTA stroke multiphase 12/7/2023  Impression:  No evidence of acute hemorrhage or major vascular distribution infarct.   Large region encephalomalacia centered in the right temporal lobe, with overlying craniotomy.  Recommend correlation with clinical history.   Left inferior frontal encephalomalacia, prior TBI.   CT arteriogram demonstrates no evidence of high-grade stenosis or large vessel occlusion.   Endotracheal tube extending to the right mainstem bronchus with complete atelectasis of the left lung, new from recent radiograph.   Further workup as warranted clinically.       "

## 2023-12-07 NOTE — ED NOTES
Patient identifiers for Harvey Gould 43 y.o. female checked and correct.  Chief Complaint   Patient presents with    Seizures     Arrives via EMS with seizure activity coming from [facility], per [facility] pt seizing for 10 minutes, received a total of 15 mg versed - 10 IM and 5 IV pt arrives on face mask 10 L      Past Medical History:   Diagnosis Date    Alcohol use, unspecified with unspecified alcohol-induced disorder     Depression     Dysphagia     Hypertension     Hypothyroidism, unspecified     Seizures      Allergies reported:   Review of patient's allergies indicates:   Allergen Reactions    Percocet [oxycodone-acetaminophen] Hives         LOC: Patient is reasponsive to pain with eyes closed.  APPEARANCE: Patient is diaphoretic.  HEENT: - JVD, + midline trach  SKIN: The skin is warm and dry. Patient has normal skin turgor and moist mucus membranes.   MUSKULOSKELETAL: Patient has FROM but is not moving extremities.  RESPIRATORY: Airway is open and patent. Respirations are spontaneous but labored. Pt arrives on 15 lpm O2 via NRB.  CARDIAC: Patient has a tachycardic rate and regular rhythm of 115 on cardiac monitor. No peripheral edema noted.   ABDOMEN: No distention noted. Soft and non-tender upon palpation.  NEUROLOGICAL: pupils 3 mm, reactive to light. No unilateral deficits noted. Pt is aphasic and responsive to pain.

## 2023-12-07 NOTE — CONSULTS
[facility]  Vascular Neurology  [facility]  Consult Note    Inpatient consult to Vascular (Stroke) Neurology  Consult performed by: Lucina Garcia PA-C  Consult ordered by: Manny Kirkland MD        Assessment/Plan:     Patient is a 43 y.o. year old female with:    Unresponsiveness  Harvey Gould is a 43 y.o. female with PMH of HTN, seizures, alcohol use disorder, hypothyroidism, former smoker that presents to the ED via EMS for witnessed seizures and concern for status. History obtained from chart review and ED team. LKN 30min PTA per ED provider. Patient reportedly found unresponsive at NH and had witnessed seizure by EMS. Seizure reportedly lasted 10 minutes. Given versed IM, versed IN, and versed IV by EMS en route to ED. While in ED, stroke code activated for concern for status and unequal pupils. Exam limited due to versed. Patient somnolent, no movement in extremities, withdrawing to pain in LUE only, no movement in BLE/RUE, corneal reflexes intact bilaterally. NIHSS 28. Imaging delayed due to patient requiring stabilization and intubation prior to obtaining scans.     CTA stroke multiphase unremarkable for evidence of acute hemorrhage, LVO, or critical stenosis  Not candidate for acute interventions with thrombolytics or thrombectomy      Recommendations:  -MRI brain pending for further stroke evaluation  -Consider EEG/neuro consult for further seizure eval and management  -Ongoing workup per ED team  -VN will follow up on imaging results and provide additional recs pending MRI results  -Please contact stroke team with any questions/concerns        STROKE DOCUMENTATION     Acute Stroke Times   Stroke Team Called Date: 12/07/23  Stroke Team Called Time: 1241  Stroke Team Arrival Date: 12/07/23  Stroke Team Arrival Time: 1242  CT Interpretation Time: 1345  Thrombolytic Therapy Recommended: No  CTA Interpretation Time: 1352  Thrombectomy Recommended: No    NIH Scale:  1a. Level of  Consciousness: 3-->Responds only with reflex motor or autonomic effects or totally unresponsive, flaccid, and areflexic  1b. LOC Questions: 2-->Answers neither question correctly  1c. LOC Commands: 2-->Performs neither task correctly  2. Best Gaze: 0-->Normal  3. Visual: 0-->No visual loss  4. Facial Palsy: 0-->Normal symmetrical movements  5a. Motor Arm, Left: 2-->Some effort against gravity, limb cannot get to or maintain (if cued) 90 (or 45) degrees, drifts down to bed, but has some effort against gravity  5b. Motor Arm, Right: 4-->No movement  6a. Motor Leg, Left: 4-->No movement  6b. Motor Leg, Right: 4-->No movement  7. Limb Ataxia: 0-->Absent  8. Sensory: 2-->Severe to total sensory loss, patient is not aware of being touched in the face, arm, and leg  9. Best Language: 3-->Mute, global aphasia, no usable speech or auditory comprehension  10. Dysarthria: 2-->Severe dysarthria, patients speech is so slurred as to be unintelligible in the absence of or out of proportion to any dysphasia, or is mute/anarthric  11. Extinction and Inattention (formerly Neglect): 0-->No abnormality  Total (NIH Stroke Scale): 28    Modified [place]    Bela Coma Scale:6   ABCD2 Score:    [id number] Score:   HAS -BLED Score:   ICH Score:   Hunt & White Classification:       Thrombolysis Candidate? No, Seizure at onset , History of intracranial hemorrhage due to AVM or amyloid angiopathy     Delays to Thrombolysis?  Not Applicable    Interventional Revascularization Candidate?   Is the patient eligible for mechanical endovascular reperfusion (BARB)?  No; No large vessel occlusion identified on imaging     Delays to Thrombectomy? Not Applicable    Hemorrhagic change of an Ischemic Stroke: Does this patient have an ischemic stroke with hemorrhagic changes? No     Subjective:     History of Present Illness:  Harvey Gould is a 43 y.o. female with PMH of HTN, seizures, alcohol use disorder, hypothyroidism, former smoker that presents to the  ED via EMS for witnessed seizures and concern for status. History obtained from chart review and ED team. LKN 30min PTA per ED provider. Patient reportedly found unresponsive at NH and had witnessed seizure by EMS. Seizure reportedly lasted 10 minutes. Given versed IM, versed IN, and versed IV by EMS en route to ED. While in ED, stroke code activated for concern for status and unequal pupils. Exam limited due to versed. Patient somnolent, no movement in extremities, withdrawing to pain in LUE only, no movement in BLE/RUE, corneal reflexes intact bilaterally. NIHSS 28. Imaging delayed due to patient requiring stabilization and intubation prior to obtaining scans. CTA stroke multiphase unremarkable for evidence of acute hemorrhage, LVO, or critical stenosis. Not candidate for acute interventions with thrombolytics or thrombectomy. MRI brain pending for further stroke evaluation.            Past Medical History:   Diagnosis Date    Alcohol use, unspecified with unspecified alcohol-induced disorder     Depression     Dysphagia     Hypertension     Hypothyroidism, unspecified     Seizures      Past Surgical History:   Procedure Laterality Date    brain Surgery      ESOPHAGOGASTRODUODENOSCOPY N/A 7/6/2023    Procedure: EGD (ESOPHAGOGASTRODUODENOSCOPY);  Surgeon: Carol Alfonso MD;  Location: [facility];  Service: Endoscopy;  Laterality: N/A;    TUBAL LIGATION         History reviewed. No pertinent family history.  Social History     Tobacco Use    Smoking status: Former     Current packs/day: 1.00     Average packs/day: 1 pack/day for 15.0 years (15.0 ttl pk-yrs)     Types: Cigarettes    Smokeless tobacco: Former   Substance Use Topics    Alcohol use: Not Currently     Review of patient's allergies indicates:   Allergen Reactions    Percocet [oxycodone-acetaminophen] Hives       Medications: I have reviewed the current medication administration record.    (Not in a hospital admission)      Review of Systems   Unable to  "perform ROS: Patient unresponsive     Objective:     Vital Signs (Most Recent):  Temp: 98.4 °F (36.9 °C) (12/07/23 1600)  Pulse: 99 (12/07/23 1600)  Resp: 18 (12/07/23 1600)  BP: 106/71 (12/07/23 1600)  SpO2: 99 % (12/07/23 1600)    Vital Signs Range (Last 24H):  Temp:  [97.3 °F (36.3 °C)-98.6 °F (37 °C)]   Pulse:  []   Resp:  [18-53]   BP: (106-206)/()   SpO2:  [98 %-100 %]        Physical Exam  Vitals and nursing note reviewed.   Constitutional:       General: She is in acute distress.      Appearance: She is ill-appearing.   HENT:      Head: Normocephalic.      Nose: Nose normal.   Eyes:      Conjunctiva/sclera: Conjunctivae normal.   Cardiovascular:      Rate and Rhythm: Tachycardia present.   Musculoskeletal:         General: No deformity.   Skin:     General: Skin is warm.   Neurological:      Mental Status: She is unresponsive.      GCS: GCS eye subscore is 1. GCS verbal subscore is 1. GCS motor subscore is 4.      Motor: No seizure activity.      Comments: Exam limited due to unresponsive/sedation with versed. No movement to pain in BLE/RUE, withdraws in LUE. Corneals intact.              Neurological Exam:   LOC: obtunded  Attention Span: poor  Language: Global aphasia  Articulation: Untestable due to severe aphasia   Orientation: Untestable due to severe aphasia   Motor/Sensation: No movement in BLE/RUE, withdraws to pain in LUE      Laboratory:  CMP:   Recent Labs   Lab 12/07/23  1305   CALCIUM 9.8   ALBUMIN 4.3   PROT 8.8*      K 3.6   CO2 22*      BUN 8   CREATININE 0.8   ALKPHOS 134   ALT 32   AST 36   BILITOT 0.4     CBC:   Recent Labs   Lab 12/07/23  1305   WBC 10.17   RBC 4.72   HGB 13.9   HCT 39.8      MCV 84   MCH 29.4   MCHC 34.9     Lipid Panel:   Recent Labs   Lab 12/07/23  1305   CHOL 211*   LDLCALC 106.0   HDL 70   TRIG 175*     Coagulation:   Recent Labs   Lab 12/07/23  1305   INR 1.0     Hgb A1C: No results for input(s): "HGBA1C" in the last 168 hours.  TSH: "   Recent Labs   Lab 12/07/23  1305   TSH 6.501*       Diagnostic Results:      Brain/Vessel Imaging:  CTA stroke multiphase 12/7/2023  Impression:  No evidence of acute hemorrhage or major vascular distribution infarct.   Large region encephalomalacia centered in the right temporal lobe, with overlying craniotomy.  Recommend correlation with clinical history.   Left inferior frontal encephalomalacia, prior TBI.   CT arteriogram demonstrates no evidence of high-grade stenosis or large vessel occlusion.   Endotracheal tube extending to the right mainstem bronchus with complete atelectasis of the left lung, new from recent radiograph.   Further workup as warranted clinically.         Lucina Garcia PA-C  [facility]  Department of Vascular Neurology   [facility] Dept

## 2023-12-07 NOTE — ASSESSMENT & PLAN NOTE
Harvey Gould is a 43 y.o. female with PMH of HTN, seizures, alcohol use disorder, hypothyroidism, former smoker that presents to the ED via EMS for witnessed seizures and concern for status. History obtained from chart review and ED team. LKN 30min PTA per ED provider. Patient reportedly found unresponsive at NH and had witnessed seizure by EMS. Seizure reportedly lasted 10 minutes. Given versed IM, versed IN, and versed IV by EMS en route to ED. While in ED, stroke code activated for concern for status and unequal pupils. Exam limited due to versed. Patient somnolent, no movement in extremities, withdrawing to pain in LUE only, no movement in BLE/RUE, corneal reflexes intact bilaterally. NIHSS 28. Imaging delayed due to patient requiring stabilization and intubation prior to obtaining scans.     CTA stroke multiphase unremarkable for evidence of acute hemorrhage, LVO, or critical stenosis  Not candidate for acute interventions with thrombolytics or thrombectomy      Recommendations:  -MRI brain pending for further stroke evaluation  -Consider EEG/neuro consult for further seizure eval and management  -Ongoing workup per ED team  -VN will follow up on imaging results and provide additional recs pending MRI results  -Please contact stroke team with any questions/concerns

## 2023-12-08 PROBLEM — E87.6 HYPOKALEMIA: Status: ACTIVE | Noted: 2023-12-08

## 2023-12-08 LAB
ALBUMIN SERPL BCP-MCNC: 3.8 G/DL (ref 3.5–5.2)
ALP SERPL-CCNC: 114 U/L (ref 55–135)
ALT SERPL W/O P-5'-P-CCNC: 23 U/L (ref 10–44)
ANION GAP SERPL CALC-SCNC: 12 MMOL/L (ref 8–16)
AST SERPL-CCNC: 27 U/L (ref 10–40)
BASOPHILS # BLD AUTO: 0.06 K/UL (ref 0–0.2)
BASOPHILS NFR BLD: 0.7 % (ref 0–1.9)
BILIRUB SERPL-MCNC: 0.6 MG/DL (ref 0.1–1)
BUN SERPL-MCNC: 7 MG/DL (ref 6–20)
CALCIUM SERPL-MCNC: 10 MG/DL (ref 8.7–10.5)
CHLORIDE SERPL-SCNC: 107 MMOL/L (ref 95–110)
CO2 SERPL-SCNC: 21 MMOL/L (ref 23–29)
CREAT SERPL-MCNC: 0.7 MG/DL (ref 0.5–1.4)
DIFFERENTIAL METHOD: ABNORMAL
EOSINOPHIL # BLD AUTO: 0.1 K/UL (ref 0–0.5)
EOSINOPHIL NFR BLD: 1.2 % (ref 0–8)
ERYTHROCYTE [DISTWIDTH] IN BLOOD BY AUTOMATED COUNT: 12.6 % (ref 11.5–14.5)
EST. GFR  (NO RACE VARIABLE): >60 ML/MIN/1.73 M^2
GLUCOSE SERPL-MCNC: 94 MG/DL (ref 70–110)
HCT VFR BLD AUTO: 34.7 % (ref 37–48.5)
HGB BLD-MCNC: 12 G/DL (ref 12–16)
IMM GRANULOCYTES # BLD AUTO: 0.03 K/UL (ref 0–0.04)
IMM GRANULOCYTES NFR BLD AUTO: 0.3 % (ref 0–0.5)
LYMPHOCYTES # BLD AUTO: 3.1 K/UL (ref 1–4.8)
LYMPHOCYTES NFR BLD: 33.9 % (ref 18–48)
MAGNESIUM SERPL-MCNC: 1.5 MG/DL (ref 1.6–2.6)
MCH RBC QN AUTO: 28.2 PG (ref 27–31)
MCHC RBC AUTO-ENTMCNC: 34.6 G/DL (ref 32–36)
MCV RBC AUTO: 82 FL (ref 82–98)
MONOCYTES # BLD AUTO: 0.7 K/UL (ref 0.3–1)
MONOCYTES NFR BLD: 7.9 % (ref 4–15)
NEUTROPHILS # BLD AUTO: 5.1 K/UL (ref 1.8–7.7)
NEUTROPHILS NFR BLD: 56 % (ref 38–73)
NRBC BLD-RTO: 0 /100 WBC
PHOSPHATE SERPL-MCNC: 3.7 MG/DL (ref 2.7–4.5)
PLATELET # BLD AUTO: 281 K/UL (ref 150–450)
PMV BLD AUTO: 9.8 FL (ref 9.2–12.9)
POCT GLUCOSE: 95 MG/DL (ref 70–110)
POTASSIUM SERPL-SCNC: 3.3 MMOL/L (ref 3.5–5.1)
PROT SERPL-MCNC: 7.6 G/DL (ref 6–8.4)
RBC # BLD AUTO: 4.26 M/UL (ref 4–5.4)
SODIUM SERPL-SCNC: 140 MMOL/L (ref 136–145)
WBC # BLD AUTO: 9.15 K/UL (ref 3.9–12.7)

## 2023-12-08 PROCEDURE — C9254 INJECTION, LACOSAMIDE: HCPCS | Performed by: REGISTERED NURSE

## 2023-12-08 PROCEDURE — 80053 COMPREHEN METABOLIC PANEL: CPT | Performed by: REGISTERED NURSE

## 2023-12-08 PROCEDURE — 25000003 PHARM REV CODE 250: Performed by: INTERNAL MEDICINE

## 2023-12-08 PROCEDURE — 63600175 PHARM REV CODE 636 W HCPCS: Performed by: REGISTERED NURSE

## 2023-12-08 PROCEDURE — 25000003 PHARM REV CODE 250: Performed by: STUDENT IN AN ORGANIZED HEALTH CARE EDUCATION/TRAINING PROGRAM

## 2023-12-08 PROCEDURE — 25000003 PHARM REV CODE 250: Performed by: PHYSICIAN ASSISTANT

## 2023-12-08 PROCEDURE — 25000003 PHARM REV CODE 250: Performed by: REGISTERED NURSE

## 2023-12-08 PROCEDURE — 84100 ASSAY OF PHOSPHORUS: CPT | Performed by: REGISTERED NURSE

## 2023-12-08 PROCEDURE — 85025 COMPLETE CBC W/AUTO DIFF WBC: CPT | Performed by: REGISTERED NURSE

## 2023-12-08 PROCEDURE — 83735 ASSAY OF MAGNESIUM: CPT | Performed by: REGISTERED NURSE

## 2023-12-08 PROCEDURE — 21400001 HC TELEMETRY ROOM

## 2023-12-08 PROCEDURE — 92610 EVALUATE SWALLOWING FUNCTION: CPT

## 2023-12-08 RX ORDER — IBUPROFEN 400 MG/1
400 TABLET ORAL EVERY 6 HOURS PRN
Status: DISCONTINUED | OUTPATIENT
Start: 2023-12-08 | End: 2023-12-11 | Stop reason: HOSPADM

## 2023-12-08 RX ORDER — LANOLIN ALCOHOL/MO/W.PET/CERES
400 CREAM (GRAM) TOPICAL ONCE
Status: COMPLETED | OUTPATIENT
Start: 2023-12-08 | End: 2023-12-08

## 2023-12-08 RX ORDER — POTASSIUM CHLORIDE 20 MEQ/1
40 TABLET, EXTENDED RELEASE ORAL ONCE
Status: COMPLETED | OUTPATIENT
Start: 2023-12-08 | End: 2023-12-08

## 2023-12-08 RX ORDER — HYDRALAZINE HYDROCHLORIDE 25 MG/1
25 TABLET, FILM COATED ORAL EVERY 8 HOURS PRN
Status: DISCONTINUED | OUTPATIENT
Start: 2023-12-08 | End: 2023-12-11 | Stop reason: HOSPADM

## 2023-12-08 RX ORDER — TALC
6 POWDER (GRAM) TOPICAL NIGHTLY PRN
Status: DISCONTINUED | OUTPATIENT
Start: 2023-12-08 | End: 2023-12-11 | Stop reason: HOSPADM

## 2023-12-08 RX ORDER — MUPIROCIN 20 MG/G
OINTMENT TOPICAL 2 TIMES DAILY
Status: DISCONTINUED | OUTPATIENT
Start: 2023-12-08 | End: 2023-12-11 | Stop reason: HOSPADM

## 2023-12-08 RX ADMIN — Medication 400 MG: at 06:12

## 2023-12-08 RX ADMIN — LEVOTHYROXINE SODIUM 25 MCG: 25 TABLET ORAL at 06:12

## 2023-12-08 RX ADMIN — LACOSAMIDE 100 MG: 10 INJECTION INTRAVENOUS at 06:12

## 2023-12-08 RX ADMIN — Medication 6 MG: at 08:12

## 2023-12-08 RX ADMIN — LACOSAMIDE 100 MG: 10 INJECTION INTRAVENOUS at 09:12

## 2023-12-08 RX ADMIN — GABAPENTIN 300 MG: 300 CAPSULE ORAL at 08:12

## 2023-12-08 RX ADMIN — MUPIROCIN: 20 OINTMENT TOPICAL at 08:12

## 2023-12-08 RX ADMIN — POTASSIUM CHLORIDE 40 MEQ: 1500 TABLET, EXTENDED RELEASE ORAL at 06:12

## 2023-12-08 RX ADMIN — MUPIROCIN: 20 OINTMENT TOPICAL at 11:12

## 2023-12-08 RX ADMIN — AMLODIPINE BESYLATE 10 MG: 10 TABLET ORAL at 09:12

## 2023-12-08 RX ADMIN — SERTRALINE HYDROCHLORIDE 25 MG: 25 TABLET ORAL at 09:12

## 2023-12-08 RX ADMIN — GABAPENTIN 300 MG: 300 CAPSULE ORAL at 09:12

## 2023-12-08 NOTE — HOSPITAL COURSE
Received 15 of Versed per EMS and was subsequently intubated and started on Propofol and Fentanyl in the ED. On my eval was awake and following commands. EEG cap without seizure activity. Stopped sedation and extubated in ED without difficulty. No further seizure activity.  Neurology consulted.  Vimpat increase to 150 mg twice daily.  Patient discharged back to nursing home.

## 2023-12-08 NOTE — PT/OT/SLP EVAL
Speech Language Pathology Evaluation  Bedside Swallow  Discharge    Patient Name:  Harvey Gould   MRN:  [id number]  ED 29/29    Admitting Diagnosis: Seizure    Recommendations:                 General Recommendations:  Follow-up not indicated  Diet recommendations:  Regular Diet - IDDSI Level 7, Thin liquids - IDDSI Level 0   Aspiration Precautions: Standard aspiration precautions   General Precautions: Standard,    Communication strategies:  none    Assessment:     Harvey Gould is a 43 y.o. female with adequate tolerance of regular solids and thin liquids. No further skilled acute Speech Therapy services warranted at this time for swallowing. Please re-consult as needed.       History:     Past Medical History:   Diagnosis Date    Alcohol use, unspecified with unspecified alcohol-induced disorder     Depression     Dysphagia     Hypertension     Hypothyroidism, unspecified     Seizures        Past Surgical History:   Procedure Laterality Date    brain Surgery      ESOPHAGOGASTRODUODENOSCOPY N/A 7/6/2023    Procedure: EGD (ESOPHAGOGASTRODUODENOSCOPY);  Surgeon: Carol Alfonso MD;  Location: [facility];  Service: Endoscopy;  Laterality: N/A;    TUBAL LIGATION         Social History: Patient [facility] at [facility].     Prior Intubation HX:  12/7    Chest X-Rays: Heart size is normal, as is the appearance of the pulmonary vascularity.  Lung zones continue clear, and are free of significant airspace consolidation or volume loss.  No pleural fluid.  No abnormal mediastinal widening.  No pneumothorax.       Subjective     Patient awake and cooperative.     Patient goals:  to go back to [place]    Objective:     Oral Musculature Evaluation  Oral Musculature: WFL  Mucosal Quality: adequate  Mandibular Strength and Mobility: WFL  Oral Labial Strength and Mobility: WFL  Lingual Strength and Mobility: WFL  Volitional Swallow: timely  Voice Prior to PO Intake: clear; patient reports slight difference in vocal quality 2/2  intubation    Bedside Swallow Eval:   Consistencies Assessed:  Thin liquid sips of water via cup and straw   Solids bites of joslyn cracker      Oral Phase:   WFL     Pharyngeal Phase:   no overt clinical signs/symptoms of aspiration  no overt clinical signs/symptoms of pharyngeal dysphagia     Compensatory Strategies  None     Treatment: SLP provided patient education on SLP role, goals, and d/c from ST services. Patient in agreement.     Goals:   Multidisciplinary Problems       SLP Goals       Not on file              Multidisciplinary Problems (Resolved)          Problem: SLP    Goal Priority Disciplines Outcome   SLP Goal   (Resolved)     SLP Met                       Plan:       Plan of Care reviewed with:  patient   SLP Follow-Up:  No       Discharge recommendations:   (no swallowing needs)   Barriers to Discharge:  None    Time Tracking:     SLP Treatment Date:   12/08/23  Speech Start Time:  1324  Speech Stop Time:  1334     Speech Total Time (min):  10 min    Billable Minutes: Eval Swallow and Oral Function 10    12/08/2023

## 2023-12-08 NOTE — H&P
[facility] Emergency Dept  Neurocritical Care  History & Physical    Admit Date: 12/7/2023  Service Date: 12/07/2023  Length of Stay: 0    Subjective:     Chief Complaint: <principal problem not specified>    History of Present Illness: 44 y/o F w/ PMH of HTN, seizures, alcohol use disorder, hypothyroidism, non-ruptured cerebral aneurysm, TBI, and previous strokes presenting to ED from [facility] w/ seizure activity. Per [place] DON, patient is neurologically intact and uses a wheelchair at baseline d/t residual L sided weakness from previous stroke. She reports patient was found to be having seizure-like activity on the outside patio by nursing home staff. States episode lasted approx 15-20 minutes. EMS administered 15mg of Versed (10mg IM and 5mg IV), after which she was brought to the ED. Unresponsive on presentation and subsequently intubated for airway protection. Code Stroke activated in ED, CTA unremarkable for evidence of acute hemorrhage, LVO, or critical stenosis. Focal encephalomalacia in inferior L frontal lobe, site of prior traumatic contusion. Per ED staff, patient continued to have seizure-like activity following intubation requiring propofol and fentanyl gtts. Loaded w/ 2g Keppra. Sedation weaned, no further seizure activity noted. Placed on ROYAL - bihemispheric slowing, but no seizures per Epilepsy. Awake and alert, following all commands after discontinuation of propofol and fentanyl. Extubated in ED w/ no issues. Oriented x 4. Will admit to NCC for monitoring/management with plans to step down tomorrow.      Past Medical History:   Diagnosis Date    Alcohol use, unspecified with unspecified alcohol-induced disorder     Depression     Dysphagia     Hypertension     Hypothyroidism, unspecified     Seizures      Past Surgical History:   Procedure Laterality Date    brain Surgery      ESOPHAGOGASTRODUODENOSCOPY N/A 7/6/2023    Procedure: EGD (ESOPHAGOGASTRODUODENOSCOPY);  Surgeon:  Carol Alfonso MD;  Location: [facility];  Service: Endoscopy;  Laterality: N/A;    TUBAL LIGATION        No current facility-administered medications on file prior to encounter.     Current Outpatient Medications on File Prior to Encounter   Medication Sig Dispense Refill    amLODIPine (NORVASC) 10 MG tablet Take 10 mg by mouth.      lacosamide (VIMPAT) 100 mg Tab Take 100 mg by mouth every 12 (twelve) hours.      lactulose (CHRONULAC) 10 gram/15 mL solution Take 10 g by mouth 3 (three) times daily.      levETIRAcetam (KEPPRA) 1000 MG tablet Take 1 tablet (1,000 mg total) by mouth 2 (two) times daily. 60 tablet 0    levothyroxine (SYNTHROID) 25 MCG tablet Take 25 mcg by mouth.      memantine (NAMENDA) 5 MG Tab Take 5 mg by mouth once daily.      sertraline (ZOLOFT) 25 MG tablet Take 25 mg by mouth.      [DISCONTINUED] ondansetron (ZOFRAN-ODT) 4 MG TbDL Take 1 tablet (4 mg total) by mouth every 6 (six) hours as needed. 15 tablet 0      Allergies: Percocet [oxycodone-acetaminophen]    N/A  History reviewed. No pertinent family history.  Social History     Tobacco Use    Smoking status: Former     Current packs/day: 1.00     Average packs/day: 1 pack/day for 15.0 years (15.0 ttl pk-yrs)     Types: Cigarettes    Smokeless tobacco: Former   Substance Use Topics    Alcohol use: Not Currently     Review of Systems   Respiratory:  Negative for cough and shortness of breath.    Cardiovascular:  Negative for chest pain.   Gastrointestinal:  Positive for nausea and vomiting. Negative for abdominal pain.   Neurological:  Positive for seizures and weakness (L sided weakness at baseline). Negative for speech difficulty, light-headedness and headaches.   All other systems reviewed and are negative.    Objective:     Vitals:    Temp: 99.7 °F (37.6 °C)  Pulse: 62  BP: (!) 142/86  MAP (mmHg): 109  Resp: 16  SpO2: 97 %  Oxygen Concentration (%): 24  Vent Mode: SIMV  Set Rate: 18 BPM  Vt Set: 400 mL  Pressure Support: 5  cmH20  PEEP/CPAP: 5 cmH20  Peak Airway Pressure: 36 cmH20  Mean Airway Pressure: 4.4 cmH20  Plateau Pressure: 0 cmH20    Temp  Min: 97.3 °F (36.3 °C)  Max: 100 °F (37.8 °C)  Pulse  Min: 62  Max: 153  BP  Min: 102/59  Max: 206/132  MAP (mmHg)  Min: 74  Max: 161  Resp  Min: 16  Max: 53  SpO2  Min: 96 %  Max: 100 %  Oxygen Concentration (%)  Min: 24  Max: 100    No intake/output data recorded.            Physical Exam  Vitals and nursing note reviewed.   Constitutional:       General: She is not in acute distress.     Appearance: She is not toxic-appearing.   HENT:      Mouth/Throat:      Mouth: Mucous membranes are moist.      Pharynx: Oropharynx is clear.   Eyes:      Extraocular Movements: Extraocular movements intact.      Pupils: Pupils are equal, round, and reactive to light.   Cardiovascular:      Rate and Rhythm: Regular rhythm. Tachycardia present.      Pulses: Normal pulses.      Heart sounds: Normal heart sounds.   Pulmonary:      Effort: Pulmonary effort is normal.      Breath sounds: Normal breath sounds.   Abdominal:      General: Bowel sounds are normal.      Palpations: Abdomen is soft.   Musculoskeletal:         General: Normal range of motion.      Cervical back: Normal range of motion.   Skin:     General: Skin is warm and dry.      Capillary Refill: Capillary refill takes less than 2 seconds.   Neurological:      Mental Status: She is alert and oriented to person, place, and time. Mental status is at baseline.      GCS: GCS eye subscore is 4. GCS verbal subscore is 5. GCS motor subscore is 6.      Cranial Nerves: Cranial nerves 2-12 are intact.      Sensory: Sensation is intact.      Motor: Motor function is intact.      Coordination: Coordination is intact.      Comments:   -E4 V5 M6  -PERRL  -Oriented to person, place, and time  -Follows commands in all extremities  -LAN spontaneously/purposefully  -RUE 5/5  -LUE 4/5  -RLE 5/5  -LLE 4/5  -Residual L sided weakness from previous strokes             Today I personally reviewed pertinent medications, lines/drains/airways, imaging, laboratory results, notably: CTA    Assessment/Plan:     Neuro  Seizure  44 y/o F from [facility] who presented to ED w/ seizure-like activity. CTA unremarkable for evidence of acute hemorrhage, LVO, or critical stenosis. Focal encephalomalacia in inferior L frontal lobe, site of prior traumatic contusion. Per ED staff, patient continued to have seizure-like activity following intubation requiring propofol and fentanyl gtts. Received 2g Keppra load in ED. Sedation weaned, no further seizure activity noted. Placed on ROYAL - bihemispheric slowing, but no seizures per Epilepsy.     -Admit to NCC  -VS/neuro checks q1h  -Seizure/aspiration precautions  -Vimpat 100mg IV BID  -Resume home gabapentin  -Intubated in ED for airway protection d/t unresponsiveness following Versed administration  -Extubated to RA w/ no issues after sedation weaned  -Will likely step down to Hospital Medicine tomorrow    Psychiatric  Depression  -Resume home Zoloft    Cardiac/Vascular  Essential hypertension  -Resume home amlodipine  -Labetalol/hydralazine PRN SBP > 160    Endocrine  Hypothyroidism  -Resume home levothyroxine          The patient is being Prophylaxed for:  Venous Thromboembolism with: Mechanical  Stress Ulcer with: Not Applicable   Ventilator Pneumonia with: not applicable    N/A  Family history non-contributory to current problem     Activity Orders            Progressive Mobility Protocol (mobilize patient to their highest level of functioning at least twice daily) starting at 12/07 2000    Diet NPO: NPO starting at 12/07 1242          Full Code    Sabra Flynn NP  Neurocritical Care  [facility]

## 2023-12-08 NOTE — HPI
44 y/o F w/ PMH of HTN, seizures, alcohol use disorder, hypothyroidism, non-ruptured cerebral aneurysm, TBI, and previous strokes presenting to ED from [facility] w/ seizure activity. Per [place] DON, patient is neurologically intact and uses a wheelchair at baseline d/t residual L sided weakness from previous stroke. She reports patient was found to be having seizure-like activity on the outside patio by nursing home staff. States episode lasted approx 15-20 minutes. EMS administered 15mg of Versed (10mg IM and 5mg IV), after which she was brought to the ED. Unresponsive on presentation and subsequently intubated for airway protection. Code Stroke activated in ED, CTA unremarkable for evidence of acute hemorrhage, LVO, or critical stenosis. Focal encephalomalacia in inferior L frontal lobe, site of prior traumatic contusion. Per ED staff, patient continued to have seizure-like activity following intubation requiring propofol and fentanyl gtts. Loaded w/ 2g Keppra. Sedation weaned, no further seizure activity noted. Placed on ROYAL - bihemispheric slowing, but no seizures per Epilepsy. Awake and alert, following all commands after discontinuation of propofol and fentanyl. Extubated in ED w/ no issues. Oriented x 4. Will admit to NCC for monitoring/management with plans to step down tomorrow.

## 2023-12-08 NOTE — PLAN OF CARE
[facility] - Emergency Dept  Initial Discharge Assessment       Primary Care Provider: Hima Gudino MD    Admission Diagnosis: Acute focal neurological deficit [R29.818]    Admission Date: 12/7/2023  Expected Discharge Date:     Pt stated she uses a wheelchair for ambulation and is independent with her ADL's.      Pt is a resident of [facility] and will return on d/c    Transition of Care Barriers: (P) None    Payor: MEDICARE / Plan: MEDICARE PART A & B / Product Type: Government /     Extended Emergency Contact Information  Primary Emergency Contact: Kamila Gould  Mobile Phone: [phone]  Relation: Sister  Secondary Emergency Contact: Manjeet Tesfaye  Mobile Phone: [phone]  Relation: Brother  Preferred language: English   needed? No    Discharge Plan A: (P) Return to nursing home  Discharge Plan B: (P) Return to Nursing Home      [id number] - [place] - [address] [facility] [address]  [facility] [address] [address]  Phone: [phone] Fax: [phone]      Initial Assessment (most recent)       Adult Discharge Assessment - 12/08/23 1257          Discharge Assessment    Assessment Type Discharge Planning Assessment (P)      Confirmed/corrected address, phone number and insurance Yes (P)      Confirmed Demographics Correct on Facesheet (P)      Source of Information patient (P)      Communicated JORGE with patient/caregiver Yes (P)      People in Home facility resident (P)      Facility Arrived From: [facility] (P)      Do you expect to return to your current living situation? Yes (P)      Do you have help at home or someone to help you manage your care at home? No (P)      Prior to hospitilization cognitive status: Alert/Oriented (P)      Current cognitive status: Alert/Oriented (P)      Walking or Climbing Stairs Difficulty yes (P)      Walking or Climbing Stairs ambulation difficulty, requires equipment (P)      Mobility Management  wheelchair (P)      Dressing/Bathing Difficulty no (P)      Home Accessibility wheelchair accessible (P)      Home Layout Able to live on 1st floor (P)      Equipment Currently Used at Home wheelchair (P)      Patient currently being followed by outpatient case management? No (P)      Do you currently have service(s) that help you manage your care at home? No (P)      Do you have any problems affording any of your prescribed medications? No (P)      Is the patient taking medications as prescribed? yes (P)      Who is going to help you get home at discharge? facility staff and friends/family (P)      How do you get to doctors appointments? agency;family or friend will provide (P)      Are you on dialysis? No (P)      Do you take coumadin? No (P)      Discharge Plan A Return to nursing home (P)      Discharge Plan B Return to Nursing Home (P)      DME Needed Upon Discharge  none (P)      Discharge Plan discussed with: Patient (P)      Transition of Care Barriers None (P)         Physical Activity    On average, how many days per week do you engage in moderate to strenuous exercise (like a brisk walk)? 3 days (P)      On average, how many minutes do you engage in exercise at this level? 50 min (P)         Financial Resource Strain    How hard is it for you to pay for the very basics like food, housing, medical care, and heating? Not very hard (P)         Housing Stability    In the last 12 months, was there a time when you were not able to pay the mortgage or rent on time? No (P)      In the last 12 months, was there a time when you did not have a steady place to sleep or slept in a shelter (including now)? No (P)         Transportation Needs    In the past 12 months, has lack of transportation kept you from medical appointments or from getting medications? No (P)      In the past 12 months, has lack of transportation kept you from meetings, work, or from getting things needed for daily living? No (P)         Food  Insecurity    Within the past 12 months, you worried that your food would run out before you got the money to buy more. Never true (P)      Within the past 12 months, the food you bought just didn't last and you didn't have money to get more. Never true (P)         Stress    Do you feel stress - tense, restless, nervous, or anxious, or unable to sleep at night because your mind is troubled all the time - these days? To some extent (P)         Social Connections    In a typical week, how many times do you talk on the phone with family, friends, or neighbors? More than three times a week (P)      How often do you get together with friends or relatives? More than three times a week (P)      How often do you attend Sikhism or Jew services? More than 4 times per year (P)      Do you belong to any clubs or organizations such as Sikhism groups, unions, fraternal or athletic groups, or school groups? No (P)      How often do you attend meetings of the clubs or organizations you belong to? Never (P)      Are you , , , , never , or living with a partner? Never  (P)         Alcohol Use    Q1: How often do you have a drink containing alcohol? Never (P)      Q2: How many drinks containing alcohol do you have on a typical day when you are drinking? Patient does not drink (P)      Q3: How often do you have six or more drinks on one occasion? Never (P)         OTHER    Name(s) of People in Home pt is resident of [facility] (P)                    Sabra Marie CD, MSW, LMSW, RSW   Case Management  [facility]  Email: [email or web address]

## 2023-12-08 NOTE — ED NOTES
Assumed care for pt after recieving report from nightshift RN. Pt. resting in bed in NAD, RR e/u. Vital signs stable and within desired limits at this time of assessment. Pt. offered bathroom assistance and denies need at this time. Explanation of care/wait provided. Pt verbalizes no needs at this time. Bed in low, locked position with rails up and call bell in reach. Pt's white board updated with today's care team and plan. ]    Patient identifiers for Harvey Gould 43 y.o. female checked and correct.  Chief Complaint   Patient presents with    Seizures     Arrives via EMS with seizure activity coming from [facility], per [place] pt seizing for 10 minutes, received a total of 15 mg versed - 10 IM and 5 IV pt arrives on face mask 10 L      Past Medical History:   Diagnosis Date    Alcohol use, unspecified with unspecified alcohol-induced disorder     Depression     Dysphagia     Hypertension     Hypothyroidism, unspecified     Seizures      Allergies reported:   Review of patient's allergies indicates:   Allergen Reactions    Percocet [oxycodone-acetaminophen] Hives         LOC: Patient is awake, alert, and aware of environment with an appropriate affect. Patient is oriented x 4 and speaking appropriately.  APPEARANCE: Patient resting comfortably and in no acute distress. Patient is clean and well groomed, patient's clothing is properly fastened.  HEENT: WDL  SKIN: The skin is warm and dry. Patient has normal skin turgor and moist mucus membranes.   MUSKULOSKELETAL: Patient is moving all extremities well, no obvious deformities noted. Pulses intact.   RESPIRATORY: Airway is open and patent. Respirations are spontaneous and non-labored with normal effort and rate.  CARDIAC: Patient has a normal rate and rhythm. 60 on cardiac monitor. No peripheral edema noted.   ABDOMEN: No distention noted. Soft and non-tender upon palpation. Ybarra cath in place.   NEUROLOGICAL: pupils 3mm, PERRL. Facial expression is symmetrical.  Hand grasps are equal bilaterally. Normal sensation in all extremities when touched with finger. EEG monitor intact and recording.

## 2023-12-08 NOTE — SUBJECTIVE & OBJECTIVE
Past Medical History:   Diagnosis Date    Alcohol use, unspecified with unspecified alcohol-induced disorder     Depression     Dysphagia     Hypertension     Hypothyroidism, unspecified     Seizures      Past Surgical History:   Procedure Laterality Date    brain Surgery      ESOPHAGOGASTRODUODENOSCOPY N/A 7/6/2023    Procedure: EGD (ESOPHAGOGASTRODUODENOSCOPY);  Surgeon: Carol Alfonso MD;  Location: [facility];  Service: Endoscopy;  Laterality: N/A;    TUBAL LIGATION        No current facility-administered medications on file prior to encounter.     Current Outpatient Medications on File Prior to Encounter   Medication Sig Dispense Refill    amLODIPine (NORVASC) 10 MG tablet Take 10 mg by mouth.      lacosamide (VIMPAT) 100 mg Tab Take 100 mg by mouth every 12 (twelve) hours.      lactulose (CHRONULAC) 10 gram/15 mL solution Take 10 g by mouth 3 (three) times daily.      levETIRAcetam (KEPPRA) 1000 MG tablet Take 1 tablet (1,000 mg total) by mouth 2 (two) times daily. 60 tablet 0    levothyroxine (SYNTHROID) 25 MCG tablet Take 25 mcg by mouth.      memantine (NAMENDA) 5 MG Tab Take 5 mg by mouth once daily.      sertraline (ZOLOFT) 25 MG tablet Take 25 mg by mouth.      [DISCONTINUED] ondansetron (ZOFRAN-ODT) 4 MG TbDL Take 1 tablet (4 mg total) by mouth every 6 (six) hours as needed. 15 tablet 0      Allergies: Percocet [oxycodone-acetaminophen]    N/A  History reviewed. No pertinent family history.  Social History     Tobacco Use    Smoking status: Former     Current packs/day: 1.00     Average packs/day: 1 pack/day for 15.0 years (15.0 ttl pk-yrs)     Types: Cigarettes    Smokeless tobacco: Former   Substance Use Topics    Alcohol use: Not Currently     Review of Systems   Respiratory:  Negative for cough and shortness of breath.    Cardiovascular:  Negative for chest pain.   Gastrointestinal:  Positive for nausea and vomiting. Negative for abdominal pain.   Neurological:  Positive for seizures and weakness (L  sided weakness at baseline). Negative for speech difficulty, light-headedness and headaches.   All other systems reviewed and are negative.    Objective:     Vitals:    Temp: 99.7 °F (37.6 °C)  Pulse: 62  BP: (!) 142/86  MAP (mmHg): 109  Resp: 16  SpO2: 97 %  Oxygen Concentration (%): 24  Vent Mode: SIMV  Set Rate: 18 BPM  Vt Set: 400 mL  Pressure Support: 5 cmH20  PEEP/CPAP: 5 cmH20  Peak Airway Pressure: 36 cmH20  Mean Airway Pressure: 4.4 cmH20  Plateau Pressure: 0 cmH20    Temp  Min: 97.3 °F (36.3 °C)  Max: 100 °F (37.8 °C)  Pulse  Min: 62  Max: 153  BP  Min: 102/59  Max: 206/132  MAP (mmHg)  Min: 74  Max: 161  Resp  Min: 16  Max: 53  SpO2  Min: 96 %  Max: 100 %  Oxygen Concentration (%)  Min: 24  Max: 100    No intake/output data recorded.            Physical Exam  Vitals and nursing note reviewed.   Constitutional:       General: She is not in acute distress.     Appearance: She is not toxic-appearing.   HENT:      Mouth/Throat:      Mouth: Mucous membranes are moist.      Pharynx: Oropharynx is clear.   Eyes:      Extraocular Movements: Extraocular movements intact.      Pupils: Pupils are equal, round, and reactive to light.   Cardiovascular:      Rate and Rhythm: Regular rhythm. Tachycardia present.      Pulses: Normal pulses.      Heart sounds: Normal heart sounds.   Pulmonary:      Effort: Pulmonary effort is normal.      Breath sounds: Normal breath sounds.   Abdominal:      General: Bowel sounds are normal.      Palpations: Abdomen is soft.   Musculoskeletal:         General: Normal range of motion.      Cervical back: Normal range of motion.   Skin:     General: Skin is warm and dry.      Capillary Refill: Capillary refill takes less than 2 seconds.   Neurological:      Mental Status: She is alert and oriented to person, place, and time. Mental status is at baseline.      GCS: GCS eye subscore is 4. GCS verbal subscore is 5. GCS motor subscore is 6.      Cranial Nerves: Cranial nerves 2-12 are intact.       Sensory: Sensation is intact.      Motor: Motor function is intact.      Coordination: Coordination is intact.      Comments:   -E4 V5 M6  -PERRL  -Oriented to person, place, and time  -Follows commands in all extremities  -LAN spontaneously/purposefully  -RUE 5/5  -LUE 4/5  -RLE 5/5  -LLE 4/5  -Residual L sided weakness from previous strokes            Today I personally reviewed pertinent medications, lines/drains/airways, imaging, laboratory results, notably: CTA

## 2023-12-08 NOTE — PLAN OF CARE
Hospital Medicine ICU Acceptance Note    Date of Admit: 12/7/2023  Date of Transfer / Stepdown: 12/8/2023  Bobrijeshs, C/J, H, L, Onc (IV chemo w/in 1 month), Gyn/Onc, or other special case?: No   ICU team stepping patient down: [facility]  ICU team member giving verbal handoff:  NCC  Accepting  team: B    Brief History of Present Illness:      Harvey Gould is a 44 y/o F w/ PMH of HTN, seizures, alcohol use disorder, hypothyroidism, non-ruptured cerebral aneurysm, TBI, and previous strokes presenting to ED from [facility] w/ seizure activity. Per [place] DON, patient is neurologically intact and uses a wheelchair at baseline d/t residual L sided weakness from previous stroke. She reports patient was found to be having seizure-like activity on the outside patio by nursing home staff. States episode lasted approx 15-20 minutes. EMS administered 15mg of Versed (10mg IM and 5mg IV), after which she was brought to the ED. Unresponsive on presentation and subsequently intubated for airway protection. Code Stroke activated in ED, CTA unremarkable for evidence of acute hemorrhage, LVO, or critical stenosis. Focal encephalomalacia in inferior L frontal lobe, site of prior traumatic contusion. Per ED staff, patient continued to have seizure-like activity following intubation requiring propofol and fentanyl gtts. Loaded w/ 2g Keppra. Sedation weaned, no further seizure activity noted. Placed on ROYAL - bihemispheric slowing, but no seizures per Epilepsy. Awake and alert, following all commands after discontinuation of propofol and fentanyl. Extubated in ED w/ no issues. Oriented x 4.         Hospital/ICU Course:     Received 15 of Versed per EMS and was subsequently intubated and started on Propofol and Fentanyl in the ED. On my eval was awake and following commands. EEG cap without seizure activity. Stopped sedation and extubated in ED without difficulty. No further seizure activity.       Consultants and  Procedures:     Consultants:  Vascular Neurology    Procedures:    EEG    Transfer Information:     Diet:  Regular    Physical Activity:  Ad brenda    To Do / Pending Studies / Follow ups:  - Follow-up neurology consult  - Follow-up EEG       Patient has been accepted by [facility] Medicine Team B, who will assume care of the patient upon arrival to the floor from the ICU. Please contact ICU team with any concerns prior to arrival. Please contact [facility] Medicine at [phone] or [phone] (please do NOT leave a voicemail) when patient arrives to the floor.    Hiren Ghosh MD  [facility] Medicine Staff

## 2023-12-08 NOTE — CARE UPDATE
Chart and imaging reviewed by Vascular Neurology provider.  No acute stroke on MRI. Continue current care to determine the cause of the patient's symptoms.    Thank you for including us in the care of this patient.  Vascular Neurology will sign off.  If the patient develops new symptoms concerning for acute stroke, please do not hesitate to re-consult.      Alma Rosa Robles DNP, AGACNP-BC, FNP-C  [facility]  Department of Vascular Neurology   [facility]

## 2023-12-08 NOTE — PROCEDURES
24 hr. Video EEG Monitoring     Date/Time: 12/7/2023     Performed by: Chon Gale MD     DATE: 12/7/23-12/8/23     ORDERING PROVIDER: Bert Mendez M.D.     INDICATION: This EEG study was performed to assess for electrographic seizures     ELECTROENCEPHALOGRAM REPORT     METHODOLOGY:  A 10-channel electroencephalogram (EEG) was recorded utilizing the ROYAL System with four scalp adhesive sensors (two electrodes per sensor) placed on the left forehead (F7), right forehead (F8), left mastoid (TP9), and right mastoid (TP10) at a sampling rate of 256 Hz /sec. Automated impedance check was formed. EEG was stored and archived in digital format. The entire recording was  visually reviewed via [employer] viewing software. Quantitative digital analysis of the signal data was performed and available during review.      Start on 12/7/2023 at 16:04:24  Stop on 12/8/2023 at 11:35:14  A total of 17 hours of EEG was recorded.     EEG FINDINGS:  The recording was obtained using paired (within individual sensors), bipolar, and transverse montages during applicable states of wakefulness, drowsiness and sleep.  In the alert state, normal elements of wakefulness were noted including a posterior background rhythm was a symmetric, 8-9Hz posterior rhythm, which reacted symmetrically to eye opening. Frontalis and sternocleidomastoid muscle artifacts were seen. During drowsiness, the background rhythm waxed and waned and there were periods of slowing.  During stage II sleep, relative slowing was noted.  There were no focal interictal epileptiform abnormalities. No electrographic seizures were recorded however portions of the recording are limited by movement artifact and absence of concomitant video.     IMPRESSION: Normal continuous EEG.  Consider conventional hook up based on clinical suspicion.      CLINICAL CORRELATION:  This study did not yield any epileptiform abnormalities. Normative findings were noted. No gross features of  cortical dysfunction were noted. No seizures were recorded during this study.

## 2023-12-08 NOTE — PLAN OF CARE
Problem: SLP  Goal: SLP Goal  Outcome: Met   Bedside Swallow Study completed. Recommend: regular diet, thin liquids, standard aspiration precautions. No further skilled ST services warranted at this time for swallowing. Please re-consult as needed.

## 2023-12-08 NOTE — ASSESSMENT & PLAN NOTE
42 y/o F from [facility] who presented to ED w/ seizure-like activity. CTA unremarkable for evidence of acute hemorrhage, LVO, or critical stenosis. Focal encephalomalacia in inferior L frontal lobe, site of prior traumatic contusion. Per ED staff, patient continued to have seizure-like activity following intubation requiring propofol and fentanyl gtts. Received 2g Keppra load in ED. Sedation weaned, no further seizure activity noted. Placed on ROYAL - bihemispheric slowing, but no seizures per Epilepsy.     -Admit to NCC  -VS/neuro checks q1h  -Seizure/aspiration precautions  -Vimpat 100mg IV BID  -Resume home gabapentin  -Intubated in ED for airway protection d/t unresponsiveness following Versed administration  -Extubated to RA w/ no issues after sedation weaned  -Will likely step down to Hospital Medicine tomorrow

## 2023-12-08 NOTE — PROVIDER PROGRESS NOTES - EMERGENCY DEPT.
Encounter Date: 12/7/2023    ED Physician Progress Notes        Physician Note:   Pt signed out to me at 2 PM    Briefly: Pt is a 43 year old female who presented with altered mental status/seizure. Non responsive on arrival with L>R pupil. Intubated by previous team for airway protection. Code stroke activated. CTA negative for acute process. Pt signed out pending MRI brain and neuro critical care evaluation. Pt with increased alertness despite propofol during my assessment. Fentanyl drip initiated with improvement in sedation. Initial VBG with O2 in the 600s. Vent FiO2 decreased to 40%. Repeat VBG with improvement in O2. Neuro crit evaluated the pt in the ED. Pt placed on EEG. EEG read as negative for seizure activity although pt with significant sedation during EEG. After reassessment by neuro ICU, pt transitioned off propofol and fentanyl to precedex and admitted to the neuro ICU    Attending Note:  Agree with above. Signed out at 2p. Ordered repeat CXR to confirm tube adjustment, in acceptable position.  Weaned Fi02. EEG placed and discussed with epilepsy.  Case discussed with Neuro ICU, accepted patient for admission, MRI pending.

## 2023-12-08 NOTE — ASSESSMENT & PLAN NOTE
Chronic, controlled. Latest blood pressure and vitals reviewed-     Temp:  [97.3 °F (36.3 °C)-100 °F (37.8 °C)]   Pulse:  []   Resp:  [13-53]   BP: (102-206)/()   SpO2:  [96 %-100 %] .   Home meds for hypertension were reviewed and noted below.   Hypertension Medications               amLODIPine (NORVASC) 10 MG tablet Take 10 mg by mouth.            While in the hospital, will manage blood pressure as follows; Continue home antihypertensive regimen    Will utilize p.r.n. blood pressure medication only if patient's blood pressure greater than 160/100 and she develops symptoms such as worsening chest pain or shortness of breath.    - Home amlodipine

## 2023-12-08 NOTE — ED NOTES
Telemetry Verification   Patient placed on Telemetry Box  Verified with War Room  Box # [id number]   Monitor Tech seun   Rate 60   Rhythm NSR

## 2023-12-08 NOTE — ASSESSMENT & PLAN NOTE
Patient has persistent depression which is mild and is currently controlled. Will Continue anti-depressant medications. We will not consult psychiatry at this time. Patient does not display psychosis at this time. Continue to monitor closely and adjust plan of care as needed.    - Home Zoloft

## 2023-12-08 NOTE — HPI
42 y/o F w/ PMH of HTN, seizures, alcohol use disorder, hypothyroidism, non-ruptured cerebral aneurysm, TBI, and previous strokes presenting to ED from [facility] w/ seizure activity. Per [place] DON, patient is neurologically intact and uses a wheelchair at baseline d/t residual L sided weakness from previous stroke. She reports patient was found to be having seizure-like activity on the outside patio by nursing home staff. States episode lasted approx 15-20 minutes. EMS administered 15mg of Versed (10mg IM and 5mg IV), after which she was brought to the ED. Unresponsive on presentation and subsequently intubated for airway protection. Code Stroke activated in ED, CTA unremarkable for evidence of acute hemorrhage, LVO, or critical stenosis. Focal encephalomalacia in inferior L frontal lobe, site of prior traumatic contusion. Per ED staff, patient continued to have seizure-like activity following intubation requiring propofol and fentanyl gtts. Loaded w/ 2g Keppra. Sedation weaned, no further seizure activity noted. Placed on ROYAL - bihemispheric slowing, but no seizures per Epilepsy. Awake and alert, following all commands after discontinuation of propofol and fentanyl. Extubated in ED w/ no issues. Oriented x 4.

## 2023-12-08 NOTE — ASSESSMENT & PLAN NOTE
44 y/o F from [facility] who presented to ED w/ seizure-like activity. CTA unremarkable for evidence of acute hemorrhage, LVO, or critical stenosis. Focal encephalomalacia in inferior L frontal lobe, site of prior traumatic contusion. Per ED staff, patient continued to have seizure-like activity following intubation requiring propofol and fentanyl gtts. Received 2g Keppra load in ED. Sedation weaned, no further seizure activity noted. Placed on ROYAL - bihemispheric slowing, but no seizures per Epilepsy.      - Admitted initially to [facility]  - Intubated in ED for airway protection d/t unresponsiveness following Versed administration  - Extubated to RA w/ no issues after sedation weaned  - VS/neuro checks q4h  - Seizure/aspiration precautions  - Neurology consulted  -- Vimpat increased to 150mg PO BID  -- Keppra 1gm BID  - Home gabapentin

## 2023-12-09 PROBLEM — G40.919 BREAKTHROUGH SEIZURE: Status: ACTIVE | Noted: 2023-12-09

## 2023-12-09 LAB
ALBUMIN SERPL BCP-MCNC: 3.7 G/DL (ref 3.5–5.2)
ALP SERPL-CCNC: 113 U/L (ref 55–135)
ALT SERPL W/O P-5'-P-CCNC: 17 U/L (ref 10–44)
ANION GAP SERPL CALC-SCNC: 9 MMOL/L (ref 8–16)
AST SERPL-CCNC: 22 U/L (ref 10–40)
BASOPHILS # BLD AUTO: 0.05 K/UL (ref 0–0.2)
BASOPHILS NFR BLD: 0.9 % (ref 0–1.9)
BILIRUB SERPL-MCNC: 0.7 MG/DL (ref 0.1–1)
BUN SERPL-MCNC: 9 MG/DL (ref 6–20)
CALCIUM SERPL-MCNC: 10.1 MG/DL (ref 8.7–10.5)
CHLORIDE SERPL-SCNC: 105 MMOL/L (ref 95–110)
CO2 SERPL-SCNC: 23 MMOL/L (ref 23–29)
CREAT SERPL-MCNC: 0.6 MG/DL (ref 0.5–1.4)
DIFFERENTIAL METHOD: ABNORMAL
EOSINOPHIL # BLD AUTO: 0.2 K/UL (ref 0–0.5)
EOSINOPHIL NFR BLD: 4.1 % (ref 0–8)
ERYTHROCYTE [DISTWIDTH] IN BLOOD BY AUTOMATED COUNT: 12.3 % (ref 11.5–14.5)
EST. GFR  (NO RACE VARIABLE): >60 ML/MIN/1.73 M^2
GLUCOSE SERPL-MCNC: 90 MG/DL (ref 70–110)
HCT VFR BLD AUTO: 35.6 % (ref 37–48.5)
HGB BLD-MCNC: 12.5 G/DL (ref 12–16)
IMM GRANULOCYTES # BLD AUTO: 0.01 K/UL (ref 0–0.04)
IMM GRANULOCYTES NFR BLD AUTO: 0.2 % (ref 0–0.5)
LYMPHOCYTES # BLD AUTO: 2.4 K/UL (ref 1–4.8)
LYMPHOCYTES NFR BLD: 42 % (ref 18–48)
MAGNESIUM SERPL-MCNC: 1.6 MG/DL (ref 1.6–2.6)
MCH RBC QN AUTO: 28.3 PG (ref 27–31)
MCHC RBC AUTO-ENTMCNC: 35.1 G/DL (ref 32–36)
MCV RBC AUTO: 81 FL (ref 82–98)
MONOCYTES # BLD AUTO: 0.4 K/UL (ref 0.3–1)
MONOCYTES NFR BLD: 7.8 % (ref 4–15)
NEUTROPHILS # BLD AUTO: 2.6 K/UL (ref 1.8–7.7)
NEUTROPHILS NFR BLD: 45 % (ref 38–73)
NRBC BLD-RTO: 0 /100 WBC
PHOSPHATE SERPL-MCNC: 3.6 MG/DL (ref 2.7–4.5)
PLATELET # BLD AUTO: 271 K/UL (ref 150–450)
PMV BLD AUTO: 10.2 FL (ref 9.2–12.9)
POCT GLUCOSE: 123 MG/DL (ref 70–110)
POTASSIUM SERPL-SCNC: 3.8 MMOL/L (ref 3.5–5.1)
PROT SERPL-MCNC: 7.5 G/DL (ref 6–8.4)
RBC # BLD AUTO: 4.42 M/UL (ref 4–5.4)
SODIUM SERPL-SCNC: 137 MMOL/L (ref 136–145)
WBC # BLD AUTO: 5.66 K/UL (ref 3.9–12.7)

## 2023-12-09 PROCEDURE — 25000003 PHARM REV CODE 250: Performed by: STUDENT IN AN ORGANIZED HEALTH CARE EDUCATION/TRAINING PROGRAM

## 2023-12-09 PROCEDURE — 25000003 PHARM REV CODE 250: Performed by: PHYSICIAN ASSISTANT

## 2023-12-09 PROCEDURE — 21400001 HC TELEMETRY ROOM

## 2023-12-09 PROCEDURE — 25000003 PHARM REV CODE 250: Performed by: INTERNAL MEDICINE

## 2023-12-09 PROCEDURE — 25000003 PHARM REV CODE 250: Performed by: REGISTERED NURSE

## 2023-12-09 PROCEDURE — 99223 1ST HOSP IP/OBS HIGH 75: CPT | Mod: GC,,, | Performed by: PSYCHIATRY & NEUROLOGY

## 2023-12-09 PROCEDURE — 36415 COLL VENOUS BLD VENIPUNCTURE: CPT | Performed by: REGISTERED NURSE

## 2023-12-09 PROCEDURE — 84100 ASSAY OF PHOSPHORUS: CPT | Performed by: REGISTERED NURSE

## 2023-12-09 PROCEDURE — 85025 COMPLETE CBC W/AUTO DIFF WBC: CPT | Performed by: REGISTERED NURSE

## 2023-12-09 PROCEDURE — 94761 N-INVAS EAR/PLS OXIMETRY MLT: CPT

## 2023-12-09 PROCEDURE — 80053 COMPREHEN METABOLIC PANEL: CPT | Performed by: REGISTERED NURSE

## 2023-12-09 PROCEDURE — 83735 ASSAY OF MAGNESIUM: CPT | Performed by: REGISTERED NURSE

## 2023-12-09 PROCEDURE — 99223 PR INITIAL HOSPITAL CARE,LEVL III: ICD-10-PCS | Mod: GC,,, | Performed by: PSYCHIATRY & NEUROLOGY

## 2023-12-09 RX ORDER — LEVETIRACETAM 500 MG/1
1000 TABLET ORAL 2 TIMES DAILY
Status: DISCONTINUED | OUTPATIENT
Start: 2023-12-09 | End: 2023-12-11 | Stop reason: HOSPADM

## 2023-12-09 RX ORDER — LACOSAMIDE 100 MG/1
100 TABLET ORAL EVERY 12 HOURS
Status: DISCONTINUED | OUTPATIENT
Start: 2023-12-09 | End: 2023-12-09

## 2023-12-09 RX ADMIN — Medication 6 MG: at 08:12

## 2023-12-09 RX ADMIN — LEVETIRACETAM 1000 MG: 500 TABLET, FILM COATED ORAL at 09:12

## 2023-12-09 RX ADMIN — GABAPENTIN 300 MG: 300 CAPSULE ORAL at 08:12

## 2023-12-09 RX ADMIN — SERTRALINE HYDROCHLORIDE 25 MG: 25 TABLET ORAL at 09:12

## 2023-12-09 RX ADMIN — MUPIROCIN: 20 OINTMENT TOPICAL at 09:12

## 2023-12-09 RX ADMIN — GABAPENTIN 300 MG: 300 CAPSULE ORAL at 09:12

## 2023-12-09 RX ADMIN — AMLODIPINE BESYLATE 10 MG: 10 TABLET ORAL at 09:12

## 2023-12-09 RX ADMIN — LACOSAMIDE 100 MG: 100 TABLET, FILM COATED ORAL at 09:12

## 2023-12-09 RX ADMIN — LEVETIRACETAM 1000 MG: 500 TABLET, FILM COATED ORAL at 08:12

## 2023-12-09 RX ADMIN — MUPIROCIN: 20 OINTMENT TOPICAL at 08:12

## 2023-12-09 RX ADMIN — LACOSAMIDE 150 MG: 100 TABLET, FILM COATED ORAL at 08:12

## 2023-12-09 NOTE — PROGRESS NOTES
[facility] ([address] Medicine  Progress Note    Patient Name: Harvey Gould  MRN: [id number]  Patient Class: IP- Inpatient   Admission Date: 12/7/2023  Length of Stay: 2 days  Attending Physician: Hiren Ghosh MD  Primary Care Provider: Hima Gudino MD        Subjective:     Principal Problem:Seizure        HPI:  42 y/o F w/ PMH of HTN, seizures, alcohol use disorder, hypothyroidism, non-ruptured cerebral aneurysm, TBI, and previous strokes presenting to ED from [facility] w/ seizure activity. Per [place] DON, patient is neurologically intact and uses a wheelchair at baseline d/t residual L sided weakness from previous stroke. She reports patient was found to be having seizure-like activity on the outside patio by nursing home staff. States episode lasted approx 15-20 minutes. EMS administered 15mg of Versed (10mg IM and 5mg IV), after which she was brought to the ED. Unresponsive on presentation and subsequently intubated for airway protection. Code Stroke activated in ED, CTA unremarkable for evidence of acute hemorrhage, LVO, or critical stenosis. Focal encephalomalacia in inferior L frontal lobe, site of prior traumatic contusion. Per ED staff, patient continued to have seizure-like activity following intubation requiring propofol and fentanyl gtts. Loaded w/ 2g Keppra. Sedation weaned, no further seizure activity noted. Placed on ROYAL - bihemispheric slowing, but no seizures per Epilepsy. Awake and alert, following all commands after discontinuation of propofol and fentanyl. Extubated in ED w/ no issues. Oriented x 4.     Overview/Hospital Course:  Received 15 of Versed per EMS and was subsequently intubated and started on Propofol and Fentanyl in the ED. On my eval was awake and following commands. EEG cap without seizure activity. Stopped sedation and extubated in ED without difficulty. No further seizure activity.     Interval History:   No events  overnight. Patient with no complaints. Unable to fully recall events leading to and after admission. Neurology consulted.      Review of Systems   Constitutional:  Negative for activity change, appetite change, chills, diaphoresis, fatigue and fever.   HENT:  Negative for rhinorrhea and sore throat.    Respiratory:  Negative for cough, chest tightness and shortness of breath.    Cardiovascular:  Negative for chest pain and palpitations.   Gastrointestinal:  Negative for abdominal pain, constipation, diarrhea and nausea.   Endocrine: Negative for cold intolerance.   Genitourinary:  Negative for decreased urine volume and dysuria.   Musculoskeletal:  Negative for arthralgias and myalgias.   Skin:  Negative for rash and wound.   Neurological:  Negative for dizziness, weakness, numbness and headaches.   Psychiatric/Behavioral:  Negative for agitation, behavioral problems and confusion.      Objective:     Vital Signs (Most Recent):  Temp: 98.4 °F (36.9 °C) (12/09/23 1215)  Pulse: 65 (12/09/23 1215)  Resp: 17 (12/09/23 1215)  BP: 138/87 (12/09/23 1215)  SpO2: 95 % (12/09/23 1215) Vital Signs (24h Range):  Temp:  [97.7 °F (36.5 °C)-98.5 °F (36.9 °C)] 98.4 °F (36.9 °C)  Pulse:  [52-76] 65  Resp:  [16-18] 17  SpO2:  [89 %-97 %] 95 %  BP: (127-138)/(68-88) 138/87     Weight: 51 kg (112 lb 7 oz)  Body mass index is 22.71 kg/m².    Intake/Output Summary (Last 24 hours) at 12/9/2023 1431  Last data filed at 12/8/2023 2103  Gross per 24 hour   Intake 120 ml   Output 800 ml   Net -680 ml         Physical Exam  Constitutional:       General: She is not in acute distress.     Appearance: Normal appearance.   HENT:      Head: Normocephalic and atraumatic.      Mouth/Throat:      Mouth: Mucous membranes are moist.   Eyes:      Extraocular Movements: Extraocular movements intact.      Conjunctiva/sclera: Conjunctivae normal.   Cardiovascular:      Rate and Rhythm: Normal rate and regular rhythm.   Pulmonary:      Effort: Pulmonary  effort is normal. No respiratory distress.      Breath sounds: Normal breath sounds. No wheezing or rales.   Abdominal:      General: Abdomen is flat. Bowel sounds are normal.      Palpations: Abdomen is soft.      Tenderness: There is no abdominal tenderness. There is no guarding.   Musculoskeletal:         General: No swelling or tenderness.   Skin:     Findings: No rash.   Neurological:      General: No focal deficit present.      Mental Status: She is alert.   Psychiatric:         Mood and Affect: Mood normal.         Behavior: Behavior normal.         Cognition and Memory: She exhibits impaired recent memory.             Significant Labs: All pertinent labs within the past 24 hours have been reviewed.  CBC:   Recent Labs   Lab 12/08/23 0354 12/09/23 0417   WBC 9.15 5.66   HGB 12.0 12.5   HCT 34.7* 35.6*    271     CMP:   Recent Labs   Lab 12/08/23 0354 12/09/23 0417    137   K 3.3* 3.8    105   CO2 21* 23   GLU 94 90   BUN 7 9   CREATININE 0.7 0.6   CALCIUM 10.0 10.1   PROT 7.6 7.5   ALBUMIN 3.8 3.7   BILITOT 0.6 0.7   ALKPHOS 114 113   AST 27 22   ALT 23 17   ANIONGAP 12 9       Significant Imaging: I have reviewed all pertinent imaging results/findings within the past 24 hours.    Assessment/Plan:      * Seizure  44 y/o F from [facility] who presented to ED w/ seizure-like activity. CTA unremarkable for evidence of acute hemorrhage, LVO, or critical stenosis. Focal encephalomalacia in inferior L frontal lobe, site of prior traumatic contusion. Per ED staff, patient continued to have seizure-like activity following intubation requiring propofol and fentanyl gtts. Received 2g Keppra load in ED. Sedation weaned, no further seizure activity noted. Placed on ROYAL - bihemispheric slowing, but no seizures per Epilepsy.      - Admitted initially to [facility]  - Intubated in ED for airway protection d/t unresponsiveness following Versed administration  - Extubated to RA w/ no issues  after sedation weaned  - VS/neuro checks q4h  - Seizure/aspiration precautions  - Neurology consulted  -- Vimpat increased to 150mg PO BID  -- Keppra 1gm BID  - Home gabapentin    Depression  Patient has persistent depression which is mild and is currently controlled. Will Continue anti-depressant medications. We will not consult psychiatry at this time. Patient does not display psychosis at this time. Continue to monitor closely and adjust plan of care as needed.    - Home Zoloft    Hypothyroidism  - Resume home levothyroxine    Essential hypertension  Chronic, controlled. Latest blood pressure and vitals reviewed-     Temp:  [97.3 °F (36.3 °C)-100 °F (37.8 °C)]   Pulse:  []   Resp:  [13-53]   BP: (102-206)/()   SpO2:  [96 %-100 %] .   Home meds for hypertension were reviewed and noted below.   Hypertension Medications               amLODIPine (NORVASC) 10 MG tablet Take 10 mg by mouth.            While in the hospital, will manage blood pressure as follows; Continue home antihypertensive regimen    Will utilize p.r.n. blood pressure medication only if patient's blood pressure greater than 160/100 and she develops symptoms such as worsening chest pain or shortness of breath.    - Home amlodipine       VTE Risk Mitigation (From admission, onward)           Ordered     Place sequential compression device  Until discontinued         12/07/23 1706     IP VTE LOW RISK PATIENT  Once         12/07/23 1706                    Discharge Planning   JORGE: 12/11/2023     Code Status: Full Code   Is the patient medically ready for discharge?: No    Reason for patient still in hospital (select all that apply): Patient trending condition, Laboratory test, Treatment, Consult recommendations, and Pending disposition  Discharge Plan A: Return to nursing home                  Hiren Ghosh MD  Department of Hospital Medicine   [facility] - Intensive Care ([facility]-)

## 2023-12-09 NOTE — ASSESSMENT & PLAN NOTE
42 y/o F w/ PMH of HTN, seizures, alcohol use disorder, hypothyroidism, non-ruptured cerebral aneurysm c/b SAH with significant R MCA encephalomalacia TBI, and previous strokes admitted to  for BT seizure at [facility] and neurology consulted for AED optimization. She had multiple BT seizures and required multiple doses of Versed and then became hypoxic. Emergent intubation at the ED and more seizure events noted for which started on propofol. Prelim EEG with no seizures and sedation off and able to extubate patient. No seizures in 24 hours and patient at baseline. Workup unremarkable for trigger for seizure including MRI, CBC, CMP, UA.   Patient denies any new medications and refers compliance with AEDs, SNF gives her medications.     Recommendations   -- Unclear reason for BT but given patient history she has reason for epilepsy for which will increase AED for better coverage   -- Increase LCM 150mg BID and continue LEV 1g BID    -- Okay to return to SNF, no further diagnostics   -- OP follow up with OP neurologist

## 2023-12-09 NOTE — HPI
44 y/o F w/ PMH of HTN, seizures, alcohol use disorder, hypothyroidism, non-ruptured cerebral aneurysm c/b SAH with significant R MCA encephalomalacia TBI, and previous strokes admitted to  for BT seizure at [facility] nursing and neurology consulted for AED optimization. History obtained from chart review as patient does not recall events.  Per [place] DON, patient is neurologically intact and uses a wheelchair at baseline d/t residual L sided weakness from previous stroke; she uses walker when doing PT/T. Patient was found to be having seizure-like activity on the outside patio by nursing home staff. States episode lasted approx 15-20 minutes. EMS administered 15mg of Versed (10mg IM and 5mg IV), after which she was brought to the ED. Unresponsive on presentation ON 10L O2 and subsequently intubated for airway protection. Code Stroke activated in ED, CTA unremarkable for evidence of acute hemorrhage, LVO, or critical stenosis. Focal encephalomalacia in inferior L frontal lobe, site of prior traumatic contusion. Per ED staff, patient continued to have seizure-like activity following intubation requiring propofol and fentanyl gtts. Loaded w/ 2g Keppra. Sedation weaned, no further seizure activity noted. Placed on ROYAL - bihemispheric slowing, but no seizures per Epilepsy. Patient was then extubated at the ED but admitted to [facility] for observation.   Formal EEG placed and no seizure or seizure focus noted. Patient s.d to  12/8 and workup so far unremarkable for infectious or metabolic cause for BT seizure. Patient is at her baseline

## 2023-12-09 NOTE — SUBJECTIVE & OBJECTIVE
Interval History:   No events overnight. Patient with no complaints. Unable to fully recall events leading to and after admission. Neurology consulted.      Review of Systems   Constitutional:  Negative for activity change, appetite change, chills, diaphoresis, fatigue and fever.   HENT:  Negative for rhinorrhea and sore throat.    Respiratory:  Negative for cough, chest tightness and shortness of breath.    Cardiovascular:  Negative for chest pain and palpitations.   Gastrointestinal:  Negative for abdominal pain, constipation, diarrhea and nausea.   Endocrine: Negative for cold intolerance.   Genitourinary:  Negative for decreased urine volume and dysuria.   Musculoskeletal:  Negative for arthralgias and myalgias.   Skin:  Negative for rash and wound.   Neurological:  Negative for dizziness, weakness, numbness and headaches.   Psychiatric/Behavioral:  Negative for agitation, behavioral problems and confusion.      Objective:     Vital Signs (Most Recent):  Temp: 98.4 °F (36.9 °C) (12/09/23 1215)  Pulse: 65 (12/09/23 1215)  Resp: 17 (12/09/23 1215)  BP: 138/87 (12/09/23 1215)  SpO2: 95 % (12/09/23 1215) Vital Signs (24h Range):  Temp:  [97.7 °F (36.5 °C)-98.5 °F (36.9 °C)] 98.4 °F (36.9 °C)  Pulse:  [52-76] 65  Resp:  [16-18] 17  SpO2:  [89 %-97 %] 95 %  BP: (127-138)/(68-88) 138/87     Weight: 51 kg (112 lb 7 oz)  Body mass index is 22.71 kg/m².    Intake/Output Summary (Last 24 hours) at 12/9/2023 1431  Last data filed at 12/8/2023 2103  Gross per 24 hour   Intake 120 ml   Output 800 ml   Net -680 ml         Physical Exam  Constitutional:       General: She is not in acute distress.     Appearance: Normal appearance.   HENT:      Head: Normocephalic and atraumatic.      Mouth/Throat:      Mouth: Mucous membranes are moist.   Eyes:      Extraocular Movements: Extraocular movements intact.      Conjunctiva/sclera: Conjunctivae normal.   Cardiovascular:      Rate and Rhythm: Normal rate and regular rhythm.    Pulmonary:      Effort: Pulmonary effort is normal. No respiratory distress.      Breath sounds: Normal breath sounds. No wheezing or rales.   Abdominal:      General: Abdomen is flat. Bowel sounds are normal.      Palpations: Abdomen is soft.      Tenderness: There is no abdominal tenderness. There is no guarding.   Musculoskeletal:         General: No swelling or tenderness.   Skin:     Findings: No rash.   Neurological:      General: No focal deficit present.      Mental Status: She is alert.   Psychiatric:         Mood and Affect: Mood normal.         Behavior: Behavior normal.         Cognition and Memory: She exhibits impaired recent memory.             Significant Labs: All pertinent labs within the past 24 hours have been reviewed.  CBC:   Recent Labs   Lab 12/08/23  0354 12/09/23  0417   WBC 9.15 5.66   HGB 12.0 12.5   HCT 34.7* 35.6*    271     CMP:   Recent Labs   Lab 12/08/23  0354 12/09/23  0417    137   K 3.3* 3.8    105   CO2 21* 23   GLU 94 90   BUN 7 9   CREATININE 0.7 0.6   CALCIUM 10.0 10.1   PROT 7.6 7.5   ALBUMIN 3.8 3.7   BILITOT 0.6 0.7   ALKPHOS 114 113   AST 27 22   ALT 23 17   ANIONGAP 12 9       Significant Imaging: I have reviewed all pertinent imaging results/findings within the past 24 hours.

## 2023-12-09 NOTE — SUBJECTIVE & OBJECTIVE
Past Medical History:   Diagnosis Date    Alcohol use, unspecified with unspecified alcohol-induced disorder     Depression     Dysphagia     Hypertension     Hypothyroidism, unspecified     Seizures        Past Surgical History:   Procedure Laterality Date    brain Surgery      ESOPHAGOGASTRODUODENOSCOPY N/A 7/6/2023    Procedure: EGD (ESOPHAGOGASTRODUODENOSCOPY);  Surgeon: Carol Alfonso MD;  Location: [facility];  Service: Endoscopy;  Laterality: N/A;    TUBAL LIGATION         Review of patient's allergies indicates:   Allergen Reactions    Percocet [oxycodone-acetaminophen] Hives       Current Neurological Medications: AS DETAILED BELOW     No current facility-administered medications on file prior to encounter.     Current Outpatient Medications on File Prior to Encounter   Medication Sig    amLODIPine (NORVASC) 10 MG tablet Take 10 mg by mouth.    lacosamide (VIMPAT) 100 mg Tab Take 100 mg by mouth every 12 (twelve) hours.    lactulose (CHRONULAC) 10 gram/15 mL solution Take 10 g by mouth 3 (three) times daily.    levETIRAcetam (KEPPRA) 1000 MG tablet Take 1 tablet (1,000 mg total) by mouth 2 (two) times daily.    levothyroxine (SYNTHROID) 25 MCG tablet Take 25 mcg by mouth.    memantine (NAMENDA) 5 MG Tab Take 5 mg by mouth once daily.    sertraline (ZOLOFT) 25 MG tablet Take 25 mg by mouth.     Family History    None       Tobacco Use    Smoking status: Former     Current packs/day: 1.00     Average packs/day: 1 pack/day for 15.0 years (15.0 ttl pk-yrs)     Types: Cigarettes    Smokeless tobacco: Former   Substance and Sexual Activity    Alcohol use: Not Currently    Drug use: Not on file    Sexual activity: Not on file     Review of Systems   Constitutional:  Negative for appetite change, chills and fever.   HENT:  Negative for congestion, sore throat, trouble swallowing and voice change.    Eyes: Negative.    Respiratory:  Negative for cough and shortness of breath.    Cardiovascular:  Negative for chest  pain and leg swelling.   Gastrointestinal:  Negative for abdominal distention, abdominal pain, constipation, nausea and vomiting.   Endocrine: Negative.    Genitourinary: Negative.    Musculoskeletal:  Negative for back pain and gait problem.   Skin: Negative.    Neurological:  Positive for seizures. Negative for weakness and headaches.   Psychiatric/Behavioral: Negative.       Objective:     Vital Signs (Most Recent):  Temp: 98.4 °F (36.9 °C) (12/09/23 1540)  Pulse: 69 (12/09/23 1540)  Resp: 17 (12/09/23 1540)  BP: 120/81 (12/09/23 1540)  SpO2: (!) 94 % (12/09/23 1540) Vital Signs (24h Range):  Temp:  [97.7 °F (36.5 °C)-98.5 °F (36.9 °C)] 98.4 °F (36.9 °C)  Pulse:  [52-71] 69  Resp:  [16-18] 17  SpO2:  [89 %-97 %] 94 %  BP: (120-138)/(68-88) 120/81     Weight: 51 kg (112 lb 7 oz)  Body mass index is 22.71 kg/m².     Physical Exam  Vitals and nursing note reviewed.   Constitutional:       Appearance: Normal appearance.   HENT:      Head: Normocephalic and atraumatic.      Nose: Nose normal.      Mouth/Throat:      Mouth: Mucous membranes are moist.   Eyes:      Extraocular Movements: Extraocular movements intact.      Pupils: Pupils are equal, round, and reactive to light.   Cardiovascular:      Rate and Rhythm: Normal rate and regular rhythm.   Pulmonary:      Breath sounds: Normal breath sounds.   Abdominal:      General: Bowel sounds are normal. There is no distension.      Palpations: Abdomen is soft.      Tenderness: There is no abdominal tenderness.   Musculoskeletal:         General: No swelling. Normal range of motion.      Cervical back: Normal range of motion.   Skin:     General: Skin is warm.      Capillary Refill: Capillary refill takes less than 2 seconds.   Neurological:      General: No focal deficit present.      Mental Status: She is alert. Mental status is at baseline.      Cranial Nerves: Cranial nerves 2-12 are intact.      Motor: Motor strength is normal.     Coordination: Finger-Nose-Finger  Test abnormal (Lue ATAXIA).      Deep Tendon Reflexes:      Reflex Scores:       Tricep reflexes are 2+ on the right side and 2+ on the left side.       Bicep reflexes are 2+ on the right side and 2+ on the left side.  Psychiatric:         Speech: Speech normal.          NEUROLOGICAL EXAMINATION:     MENTAL STATUS   Disoriented to place.   Attention: normal. Concentration: normal.   Speech: speech is normal   Level of consciousness: alert       Does not recall what happened int he last 48 hours.   Was unsure whether she was at SNF or hospital        CRANIAL NERVES   Cranial nerves II through XII intact.     CN III, IV, VI   Pupils are equal, round, and reactive to light.    MOTOR EXAM   Muscle bulk: normal  Right arm pronator drift: absent  Left arm pronator drift: absent    Strength   Strength 5/5 throughout.     REFLEXES     Reflexes   Right biceps: 2+  Left biceps: 2+  Right triceps: 2+  Left triceps: 2+    SENSORY EXAM   Light touch normal.   Left leg light touch: hyperasthesia LLEx.  Left leg vibration: decreased from ankle  Right leg proprioception: decreased from toes  Left leg proprioception: decreased from toes    GAIT AND COORDINATION      Coordination   Finger to nose coordination: abnormal (Lue ATAXIA)    Tremor   Resting tremor: absent      Significant Labs: All pertinent lab results from the past 24 hours have been reviewed.    Significant Imaging: I have reviewed and interpreted all pertinent imaging results/findings within the past 24 hours.

## 2023-12-09 NOTE — CONSULTS
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 History obtained from chart review as patient does not recall events.  Per [facility] DON, patient is neurologically intact and uses a wheelchair at baseline d/t residual L sided weakness from previous stroke; she uses walker when doing PT/T. Patient was found to be having seizure-like activity on the outside patio by nursing home staff. States episode lasted approx 15-20 minutes. EMS administered 15mg of Versed (10mg IM and 5mg IV), after which she was brought to the ED. Unresponsive on presentation ON 10L O2 and subsequently intubated for airway protection. Code Stroke activated in ED, CTA unremarkable for evidence of acute hemorrhage, LVO, or critical stenosis. Focal encephalomalacia in inferior L frontal lobe, site of prior traumatic contusion. Per ED staff, patient continued to have seizure-like activity following intubation requiring propofol and fentanyl gtts. Loaded w/ 2g Keppra. Sedation weaned, no further seizure activity noted. Placed on ROYAL - bihemispheric slowing, but no seizures per Epilepsy. Patient was then extubated at the ED but admitted to [facility] for observation.   Formal EEG placed  and no seizure or seizure focus noted. Patient s.d to  12/8 and workup so far unremarkable for infectious or metabolic cause for BT seizure. Patient is at her baseline             Past Medical History:   Diagnosis Date    Alcohol use, unspecified with unspecified alcohol-induced disorder     Depression     Dysphagia     Hypertension     Hypothyroidism, unspecified     Seizures        Past Surgical History:   Procedure Laterality Date    brain Surgery      ESOPHAGOGASTRODUODENOSCOPY N/A 7/6/2023    Procedure: EGD (ESOPHAGOGASTRODUODENOSCOPY);  Surgeon: Carol Alfonso MD;  Location: [facility];  Service: Endoscopy;  Laterality: N/A;    TUBAL LIGATION         Review of patient's allergies indicates:   Allergen Reactions    Percocet [oxycodone-acetaminophen] Hives       Current Neurological Medications: AS DETAILED BELOW     No current facility-administered medications on file prior to encounter.     Current Outpatient Medications on File Prior to Encounter   Medication Sig    amLODIPine (NORVASC) 10 MG tablet Take 10 mg by mouth.    lacosamide (VIMPAT) 100 mg Tab Take 100 mg by mouth every 12 (twelve) hours.    lactulose (CHRONULAC) 10 gram/15 mL solution Take 10 g by mouth 3 (three) times daily.    levETIRAcetam (KEPPRA) 1000 MG tablet Take 1 tablet (1,000 mg total) by mouth 2 (two) times daily.    levothyroxine (SYNTHROID) 25 MCG tablet Take 25 mcg by mouth.    memantine (NAMENDA) 5 MG Tab Take 5 mg by mouth once daily.    sertraline (ZOLOFT) 25 MG tablet Take 25 mg by mouth.     Family History    None       Tobacco Use    Smoking status: Former     Current packs/day: 1.00     Average packs/day: 1 pack/day for 15.0 years (15.0 ttl pk-yrs)     Types: Cigarettes    Smokeless tobacco: Former   Substance and Sexual Activity    Alcohol use: Not Currently    Drug use: Not on file    Sexual activity: Not on file     Review of Systems   Constitutional:  Negative for appetite change, chills and fever.   HENT:  Negative for  congestion, sore throat, trouble swallowing and voice change.    Eyes: Negative.    Respiratory:  Negative for cough and shortness of breath.    Cardiovascular:  Negative for chest pain and leg swelling.   Gastrointestinal:  Negative for abdominal distention, abdominal pain, constipation, nausea and vomiting.   Endocrine: Negative.    Genitourinary: Negative.    Musculoskeletal:  Negative for back pain and gait problem.   Skin: Negative.    Neurological:  Positive for seizures. Negative for weakness and headaches.   Psychiatric/Behavioral: Negative.       Objective:     Vital Signs (Most Recent):  Temp: 98.4 °F (36.9 °C) (12/09/23 1540)  Pulse: 69 (12/09/23 1540)  Resp: 17 (12/09/23 1540)  BP: 120/81 (12/09/23 1540)  SpO2: (!) 94 % (12/09/23 1540) Vital Signs (24h Range):  Temp:  [97.7 °F (36.5 °C)-98.5 °F (36.9 °C)] 98.4 °F (36.9 °C)  Pulse:  [52-71] 69  Resp:  [16-18] 17  SpO2:  [89 %-97 %] 94 %  BP: (120-138)/(68-88) 120/81     Weight: 51 kg (112 lb 7 oz)  Body mass index is 22.71 kg/m².     Physical Exam  Vitals and nursing note reviewed.   Constitutional:       Appearance: Normal appearance.   HENT:      Head: Normocephalic and atraumatic.      Nose: Nose normal.      Mouth/Throat:      Mouth: Mucous membranes are moist.   Eyes:      Extraocular Movements: Extraocular movements intact.      Pupils: Pupils are equal, round, and reactive to light.   Cardiovascular:      Rate and Rhythm: Normal rate and regular rhythm.   Pulmonary:      Breath sounds: Normal breath sounds.   Abdominal:      General: Bowel sounds are normal. There is no distension.      Palpations: Abdomen is soft.      Tenderness: There is no abdominal tenderness.   Musculoskeletal:         General: No swelling. Normal range of motion.      Cervical back: Normal range of motion.   Skin:     General: Skin is warm.      Capillary Refill: Capillary refill takes less than 2 seconds.   Neurological:      General: No focal deficit present.      Mental  Status: She is alert. Mental status is at baseline.      Cranial Nerves: Cranial nerves 2-12 are intact.      Motor: Motor strength is normal.     Coordination: Finger-Nose-Finger Test abnormal (Lue ATAXIA).      Deep Tendon Reflexes:      Reflex Scores:       Tricep reflexes are 2+ on the right side and 2+ on the left side.       Bicep reflexes are 2+ on the right side and 2+ on the left side.  Psychiatric:         Speech: Speech normal.          NEUROLOGICAL EXAMINATION:     MENTAL STATUS   Disoriented to place.   Attention: normal. Concentration: normal.   Speech: speech is normal   Level of consciousness: alert       Does not recall what happened int he last 48 hours.   Was unsure whether she was at SNF or hospital        CRANIAL NERVES   Cranial nerves II through XII intact.     CN III, IV, VI   Pupils are equal, round, and reactive to light.    MOTOR EXAM   Muscle bulk: normal  Right arm pronator drift: absent  Left arm pronator drift: absent    Strength   Strength 5/5 throughout.     REFLEXES     Reflexes   Right biceps: 2+  Left biceps: 2+  Right triceps: 2+  Left triceps: 2+    SENSORY EXAM   Light touch normal.   Left leg light touch: hyperasthesia LLEx.  Left leg vibration: decreased from ankle  Right leg proprioception: decreased from toes  Left leg proprioception: decreased from toes    GAIT AND COORDINATION      Coordination   Finger to nose coordination: abnormal (Lue ATAXIA)    Tremor   Resting tremor: absent      Significant Labs: All pertinent lab results from the past 24 hours have been reviewed.    Significant Imaging: I have reviewed and interpreted all pertinent imaging results/findings within the past 24 hours.  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 She had multiple BT seizures and required multiple doses of Versed and then became hypoxic. Emergent intubation at the ED and more seizure events noted for which started on propofol. Prelim EEG with no seizures and sedation off and able to extubate patient. No seizures in 24 hours and patient at baseline. Workup unremarkable for trigger for seizure including MRI, CBC, CMP, UA.   Patient denies any new medications and refers compliance with AEDs, SNF gives her medications.     Recommendations   -- Unclear reason for BT but given patient history she has reason for epilepsy for which will increase AED for better coverage   -- Increase LCM 150mg BID and continue LEV 1g BID    -- Okay to return to SNF, no further diagnostics   -- OP follow up with OP neurologist         VTE Risk Mitigation (From admission, onward)           Ordered     Place sequential compression device  Until discontinued         12/07/23 1706     IP VTE LOW RISK PATIENT  Once         12/07/23 1706                    Thank you for your consult. I will sign off. Please contact us if you have any additional questions.    Beth Nunez MD  Neurology  [facility] - Intensive Care ([place]-)

## 2023-12-10 LAB
ALBUMIN SERPL BCP-MCNC: 3.9 G/DL (ref 3.5–5.2)
ALP SERPL-CCNC: 116 U/L (ref 55–135)
ALT SERPL W/O P-5'-P-CCNC: 19 U/L (ref 10–44)
ANION GAP SERPL CALC-SCNC: 12 MMOL/L (ref 8–16)
AST SERPL-CCNC: 24 U/L (ref 10–40)
BASOPHILS # BLD AUTO: 0.05 K/UL (ref 0–0.2)
BASOPHILS NFR BLD: 0.8 % (ref 0–1.9)
BILIRUB SERPL-MCNC: 0.4 MG/DL (ref 0.1–1)
BUN SERPL-MCNC: 9 MG/DL (ref 6–20)
CALCIUM SERPL-MCNC: 10.2 MG/DL (ref 8.7–10.5)
CHLORIDE SERPL-SCNC: 104 MMOL/L (ref 95–110)
CO2 SERPL-SCNC: 25 MMOL/L (ref 23–29)
CREAT SERPL-MCNC: 0.7 MG/DL (ref 0.5–1.4)
DIFFERENTIAL METHOD: ABNORMAL
EOSINOPHIL # BLD AUTO: 0.2 K/UL (ref 0–0.5)
EOSINOPHIL NFR BLD: 2.5 % (ref 0–8)
ERYTHROCYTE [DISTWIDTH] IN BLOOD BY AUTOMATED COUNT: 12 % (ref 11.5–14.5)
EST. GFR  (NO RACE VARIABLE): >60 ML/MIN/1.73 M^2
GLUCOSE SERPL-MCNC: 105 MG/DL (ref 70–110)
HCT VFR BLD AUTO: 36.1 % (ref 37–48.5)
HGB BLD-MCNC: 13.2 G/DL (ref 12–16)
IMM GRANULOCYTES # BLD AUTO: 0.02 K/UL (ref 0–0.04)
IMM GRANULOCYTES NFR BLD AUTO: 0.3 % (ref 0–0.5)
LYMPHOCYTES # BLD AUTO: 2.3 K/UL (ref 1–4.8)
LYMPHOCYTES NFR BLD: 38.4 % (ref 18–48)
MAGNESIUM SERPL-MCNC: 1.6 MG/DL (ref 1.6–2.6)
MCH RBC QN AUTO: 29.4 PG (ref 27–31)
MCHC RBC AUTO-ENTMCNC: 36.6 G/DL (ref 32–36)
MCV RBC AUTO: 80 FL (ref 82–98)
MONOCYTES # BLD AUTO: 0.5 K/UL (ref 0.3–1)
MONOCYTES NFR BLD: 8.1 % (ref 4–15)
NEUTROPHILS # BLD AUTO: 3 K/UL (ref 1.8–7.7)
NEUTROPHILS NFR BLD: 49.9 % (ref 38–73)
NRBC BLD-RTO: 0 /100 WBC
PHOSPHATE SERPL-MCNC: 3.4 MG/DL (ref 2.7–4.5)
PLATELET # BLD AUTO: 288 K/UL (ref 150–450)
PMV BLD AUTO: 10.2 FL (ref 9.2–12.9)
POCT GLUCOSE: 105 MG/DL (ref 70–110)
POTASSIUM SERPL-SCNC: 3.5 MMOL/L (ref 3.5–5.1)
PROT SERPL-MCNC: 8 G/DL (ref 6–8.4)
RBC # BLD AUTO: 4.49 M/UL (ref 4–5.4)
SODIUM SERPL-SCNC: 141 MMOL/L (ref 136–145)
WBC # BLD AUTO: 6.04 K/UL (ref 3.9–12.7)

## 2023-12-10 PROCEDURE — 25000003 PHARM REV CODE 250: Performed by: STUDENT IN AN ORGANIZED HEALTH CARE EDUCATION/TRAINING PROGRAM

## 2023-12-10 PROCEDURE — 84100 ASSAY OF PHOSPHORUS: CPT | Performed by: REGISTERED NURSE

## 2023-12-10 PROCEDURE — 83735 ASSAY OF MAGNESIUM: CPT | Performed by: REGISTERED NURSE

## 2023-12-10 PROCEDURE — 36415 COLL VENOUS BLD VENIPUNCTURE: CPT | Performed by: REGISTERED NURSE

## 2023-12-10 PROCEDURE — 25000003 PHARM REV CODE 250: Performed by: PHYSICIAN ASSISTANT

## 2023-12-10 PROCEDURE — 25000003 PHARM REV CODE 250: Performed by: REGISTERED NURSE

## 2023-12-10 PROCEDURE — 85025 COMPLETE CBC W/AUTO DIFF WBC: CPT | Performed by: REGISTERED NURSE

## 2023-12-10 PROCEDURE — 21400001 HC TELEMETRY ROOM

## 2023-12-10 PROCEDURE — 80053 COMPREHEN METABOLIC PANEL: CPT | Performed by: REGISTERED NURSE

## 2023-12-10 RX ADMIN — LEVOTHYROXINE SODIUM 25 MCG: 25 TABLET ORAL at 05:12

## 2023-12-10 RX ADMIN — LACOSAMIDE 150 MG: 100 TABLET, FILM COATED ORAL at 09:12

## 2023-12-10 RX ADMIN — GABAPENTIN 300 MG: 300 CAPSULE ORAL at 09:12

## 2023-12-10 RX ADMIN — SERTRALINE HYDROCHLORIDE 25 MG: 25 TABLET ORAL at 09:12

## 2023-12-10 RX ADMIN — MUPIROCIN: 20 OINTMENT TOPICAL at 09:12

## 2023-12-10 RX ADMIN — LEVETIRACETAM 1000 MG: 500 TABLET, FILM COATED ORAL at 09:12

## 2023-12-10 RX ADMIN — AMLODIPINE BESYLATE 10 MG: 10 TABLET ORAL at 09:12

## 2023-12-10 RX ADMIN — Medication 6 MG: at 09:12

## 2023-12-10 NOTE — PLAN OF CARE
Problem: Fall Injury Risk  Goal: Absence of Fall and Fall-Related Injury  Outcome: Ongoing, Progressing     Problem: Restraint, Nonbehavioral (Nonviolent)  Goal: Absence of Harm or Injury  Outcome: Ongoing, Progressing     Problem: Infection  Goal: Absence of Infection Signs and Symptoms  Outcome: Ongoing, Progressing     Problem: Adult Inpatient Plan of Care  Goal: Plan of Care Review  Outcome: Ongoing, Progressing  Goal: Patient-Specific Goal (Individualized)  Outcome: Ongoing, Progressing  Goal: Absence of Hospital-Acquired Illness or Injury  Outcome: Ongoing, Progressing  Goal: Optimal Comfort and Wellbeing  Outcome: Ongoing, Progressing  Goal: Readiness for Transition of Care  Outcome: Ongoing, Progressing      No

## 2023-12-10 NOTE — PROGRESS NOTES
[facility] ([address] Medicine  Progress Note    Patient Name: Harvey Gould  MRN: [id number]  Patient Class: IP- Inpatient   Admission Date: 12/7/2023  Length of Stay: 3 days  Attending Physician: Hiren Ghosh MD  Primary Care Provider: Hima Gudino MD        Subjective:     Principal Problem:Seizure        HPI:  42 y/o F w/ PMH of HTN, seizures, alcohol use disorder, hypothyroidism, non-ruptured cerebral aneurysm, TBI, and previous strokes presenting to ED from [facility] w/ seizure activity. Per [place] DON, patient is neurologically intact and uses a wheelchair at baseline d/t residual L sided weakness from previous stroke. She reports patient was found to be having seizure-like activity on the outside patio by nursing home staff. States episode lasted approx 15-20 minutes. EMS administered 15mg of Versed (10mg IM and 5mg IV), after which she was brought to the ED. Unresponsive on presentation and subsequently intubated for airway protection. Code Stroke activated in ED, CTA unremarkable for evidence of acute hemorrhage, LVO, or critical stenosis. Focal encephalomalacia in inferior L frontal lobe, site of prior traumatic contusion. Per ED staff, patient continued to have seizure-like activity following intubation requiring propofol and fentanyl gtts. Loaded w/ 2g Keppra. Sedation weaned, no further seizure activity noted. Placed on ROYAL - bihemispheric slowing, but no seizures per Epilepsy. Awake and alert, following all commands after discontinuation of propofol and fentanyl. Extubated in ED w/ no issues. Oriented x 4.     Overview/Hospital Course:  Received 15 of Versed per EMS and was subsequently intubated and started on Propofol and Fentanyl in the ED. On my eval was awake and following commands. EEG cap without seizure activity. Stopped sedation and extubated in ED without difficulty. No further seizure activity.     Interval History:   No events  overnight. Patient with no complaints. Pending placement.      Review of Systems   Constitutional:  Negative for activity change, appetite change, chills, diaphoresis, fatigue and fever.   HENT:  Negative for rhinorrhea and sore throat.    Respiratory:  Negative for cough, chest tightness and shortness of breath.    Cardiovascular:  Negative for chest pain and palpitations.   Gastrointestinal:  Negative for abdominal pain, constipation, diarrhea and nausea.   Endocrine: Negative for cold intolerance.   Genitourinary:  Negative for decreased urine volume and dysuria.   Musculoskeletal:  Negative for arthralgias and myalgias.   Skin:  Negative for rash and wound.   Neurological:  Negative for dizziness, weakness, numbness and headaches.   Psychiatric/Behavioral:  Negative for agitation, behavioral problems and confusion.      Objective:     Vital Signs (Most Recent):  Temp: 98.4 °F (36.9 °C) (12/10/23 1145)  Pulse: 67 (12/10/23 1145)  Resp: 18 (12/10/23 0349)  BP: (!) 163/82 (12/10/23 1145)  SpO2: 97 % (12/10/23 1145) Vital Signs (24h Range):  Temp:  [97.6 °F (36.4 °C)-98.4 °F (36.9 °C)] 98.4 °F (36.9 °C)  Pulse:  [49-69] 67  Resp:  [17-18] 18  SpO2:  [94 %-99 %] 97 %  BP: (120-163)/(69-90) 163/82     Weight: 51 kg (112 lb 7 oz)  Body mass index is 22.71 kg/m².    Intake/Output Summary (Last 24 hours) at 12/10/2023 1501  Last data filed at 12/10/2023 0300  Gross per 24 hour   Intake 510 ml   Output 1100 ml   Net -590 ml         Physical Exam  Constitutional:       General: She is not in acute distress.     Appearance: Normal appearance.   HENT:      Head: Normocephalic and atraumatic.      Mouth/Throat:      Mouth: Mucous membranes are moist.   Eyes:      Extraocular Movements: Extraocular movements intact.      Conjunctiva/sclera: Conjunctivae normal.   Cardiovascular:      Rate and Rhythm: Normal rate and regular rhythm.   Pulmonary:      Effort: Pulmonary effort is normal. No respiratory distress.      Breath  sounds: Normal breath sounds. No wheezing or rales.   Abdominal:      General: Abdomen is flat. Bowel sounds are normal.      Palpations: Abdomen is soft.      Tenderness: There is no abdominal tenderness. There is no guarding.   Musculoskeletal:         General: No swelling or tenderness.   Skin:     Findings: No rash.   Neurological:      General: No focal deficit present.      Mental Status: She is alert.   Psychiatric:         Mood and Affect: Mood normal.         Behavior: Behavior normal.         Cognition and Memory: She exhibits impaired recent memory.             Significant Labs: All pertinent labs within the past 24 hours have been reviewed.  CBC:   Recent Labs   Lab 12/09/23  0417 12/10/23  0523   WBC 5.66 6.04   HGB 12.5 13.2   HCT 35.6* 36.1*    288     CMP:   Recent Labs   Lab 12/09/23  0417 12/10/23  0523    141   K 3.8 3.5    104   CO2 23 25   GLU 90 105   BUN 9 9   CREATININE 0.6 0.7   CALCIUM 10.1 10.2   PROT 7.5 8.0   ALBUMIN 3.7 3.9   BILITOT 0.7 0.4   ALKPHOS 113 116   AST 22 24   ALT 17 19   ANIONGAP 9 12       Significant Imaging: I have reviewed all pertinent imaging results/findings within the past 24 hours.    Assessment/Plan:      * Seizure  42 y/o F from [facility] who presented to ED w/ seizure-like activity. CTA unremarkable for evidence of acute hemorrhage, LVO, or critical stenosis. Focal encephalomalacia in inferior L frontal lobe, site of prior traumatic contusion. Per ED staff, patient continued to have seizure-like activity following intubation requiring propofol and fentanyl gtts. Received 2g Keppra load in ED. Sedation weaned, no further seizure activity noted. Placed on ROYAL - bihemispheric slowing, but no seizures per Epilepsy.      - Admitted initially to [facility]  - Intubated in ED for airway protection d/t unresponsiveness following Versed administration  - Extubated to RA w/ no issues after sedation weaned  - VS/neuro checks q4h  -  Seizure/aspiration precautions  - Neurology consulted  -- Vimpat increased to 150mg PO BID  -- Keppra 1gm BID  - Home gabapentin    Depression  Patient has persistent depression which is mild and is currently controlled. Will Continue anti-depressant medications. We will not consult psychiatry at this time. Patient does not display psychosis at this time. Continue to monitor closely and adjust plan of care as needed.    - Home Zoloft    Hypothyroidism  - Resume home levothyroxine    Essential hypertension  Chronic, controlled. Latest blood pressure and vitals reviewed-     Temp:  [97.3 °F (36.3 °C)-100 °F (37.8 °C)]   Pulse:  []   Resp:  [13-53]   BP: (102-206)/()   SpO2:  [96 %-100 %] .   Home meds for hypertension were reviewed and noted below.   Hypertension Medications               amLODIPine (NORVASC) 10 MG tablet Take 10 mg by mouth.            While in the hospital, will manage blood pressure as follows; Continue home antihypertensive regimen    Will utilize p.r.n. blood pressure medication only if patient's blood pressure greater than 160/100 and she develops symptoms such as worsening chest pain or shortness of breath.    - Home amlodipine       VTE Risk Mitigation (From admission, onward)           Ordered     Place sequential compression device  Until discontinued         12/07/23 1706     IP VTE LOW RISK PATIENT  Once         12/07/23 1706                    Discharge Planning   JORGE: 12/11/2023     Code Status: Full Code   Is the patient medically ready for discharge?: No    Reason for patient still in hospital (select all that apply): Patient trending condition and Pending disposition  Discharge Plan A: Return to nursing home                  Hiren Ghosh MD  Department of Hospital Medicine   [facility] - Intensive Care ([facility]

## 2023-12-10 NOTE — SUBJECTIVE & OBJECTIVE
Interval History:   No events overnight. Patient with no complaints. Pending placement.      Review of Systems   Constitutional:  Negative for activity change, appetite change, chills, diaphoresis, fatigue and fever.   HENT:  Negative for rhinorrhea and sore throat.    Respiratory:  Negative for cough, chest tightness and shortness of breath.    Cardiovascular:  Negative for chest pain and palpitations.   Gastrointestinal:  Negative for abdominal pain, constipation, diarrhea and nausea.   Endocrine: Negative for cold intolerance.   Genitourinary:  Negative for decreased urine volume and dysuria.   Musculoskeletal:  Negative for arthralgias and myalgias.   Skin:  Negative for rash and wound.   Neurological:  Negative for dizziness, weakness, numbness and headaches.   Psychiatric/Behavioral:  Negative for agitation, behavioral problems and confusion.      Objective:     Vital Signs (Most Recent):  Temp: 98.4 °F (36.9 °C) (12/10/23 1145)  Pulse: 67 (12/10/23 1145)  Resp: 18 (12/10/23 0349)  BP: (!) 163/82 (12/10/23 1145)  SpO2: 97 % (12/10/23 1145) Vital Signs (24h Range):  Temp:  [97.6 °F (36.4 °C)-98.4 °F (36.9 °C)] 98.4 °F (36.9 °C)  Pulse:  [49-69] 67  Resp:  [17-18] 18  SpO2:  [94 %-99 %] 97 %  BP: (120-163)/(69-90) 163/82     Weight: 51 kg (112 lb 7 oz)  Body mass index is 22.71 kg/m².    Intake/Output Summary (Last 24 hours) at 12/10/2023 1501  Last data filed at 12/10/2023 0300  Gross per 24 hour   Intake 510 ml   Output 1100 ml   Net -590 ml         Physical Exam  Constitutional:       General: She is not in acute distress.     Appearance: Normal appearance.   HENT:      Head: Normocephalic and atraumatic.      Mouth/Throat:      Mouth: Mucous membranes are moist.   Eyes:      Extraocular Movements: Extraocular movements intact.      Conjunctiva/sclera: Conjunctivae normal.   Cardiovascular:      Rate and Rhythm: Normal rate and regular rhythm.   Pulmonary:      Effort: Pulmonary effort is normal. No  respiratory distress.      Breath sounds: Normal breath sounds. No wheezing or rales.   Abdominal:      General: Abdomen is flat. Bowel sounds are normal.      Palpations: Abdomen is soft.      Tenderness: There is no abdominal tenderness. There is no guarding.   Musculoskeletal:         General: No swelling or tenderness.   Skin:     Findings: No rash.   Neurological:      General: No focal deficit present.      Mental Status: She is alert.   Psychiatric:         Mood and Affect: Mood normal.         Behavior: Behavior normal.         Cognition and Memory: She exhibits impaired recent memory.             Significant Labs: All pertinent labs within the past 24 hours have been reviewed.  CBC:   Recent Labs   Lab 12/09/23  0417 12/10/23  0523   WBC 5.66 6.04   HGB 12.5 13.2   HCT 35.6* 36.1*    288     CMP:   Recent Labs   Lab 12/09/23  0417 12/10/23  0523    141   K 3.8 3.5    104   CO2 23 25   GLU 90 105   BUN 9 9   CREATININE 0.6 0.7   CALCIUM 10.1 10.2   PROT 7.5 8.0   ALBUMIN 3.7 3.9   BILITOT 0.7 0.4   ALKPHOS 113 116   AST 22 24   ALT 17 19   ANIONGAP 9 12       Significant Imaging: I have reviewed all pertinent imaging results/findings within the past 24 hours.

## 2023-12-11 VITALS
WEIGHT: 112.44 LBS | OXYGEN SATURATION: 97 % | HEIGHT: 59 IN | DIASTOLIC BLOOD PRESSURE: 91 MMHG | BODY MASS INDEX: 22.67 KG/M2 | TEMPERATURE: 98 F | SYSTOLIC BLOOD PRESSURE: 135 MMHG | HEART RATE: 64 BPM | RESPIRATION RATE: 18 BRPM

## 2023-12-11 PROCEDURE — 25000003 PHARM REV CODE 250: Performed by: REGISTERED NURSE

## 2023-12-11 PROCEDURE — 25000003 PHARM REV CODE 250: Performed by: PHYSICIAN ASSISTANT

## 2023-12-11 PROCEDURE — 25000003 PHARM REV CODE 250: Performed by: STUDENT IN AN ORGANIZED HEALTH CARE EDUCATION/TRAINING PROGRAM

## 2023-12-11 RX ORDER — PREGABALIN 75 MG/1
75 CAPSULE ORAL 2 TIMES DAILY
Qty: 60 CAPSULE | Refills: 0
Start: 2023-12-11 | End: 2024-01-10

## 2023-12-11 RX ORDER — HYDROXYZINE HYDROCHLORIDE 50 MG/1
50 TABLET, FILM COATED ORAL 4 TIMES DAILY PRN
Start: 2023-12-11

## 2023-12-11 RX ORDER — MEMANTINE HYDROCHLORIDE 5 MG/1
5 TABLET ORAL DAILY
Status: DISCONTINUED | OUTPATIENT
Start: 2023-12-11 | End: 2023-12-11 | Stop reason: HOSPADM

## 2023-12-11 RX ORDER — SENNOSIDES 8.6 MG/1
1 TABLET ORAL DAILY PRN
Start: 2023-12-11

## 2023-12-11 RX ORDER — LACTULOSE 10 G/15ML
10 SOLUTION ORAL; RECTAL 2 TIMES DAILY
Refills: 0
Start: 2023-12-11

## 2023-12-11 RX ORDER — LORATADINE 10 MG/1
10 TABLET ORAL DAILY
Refills: 0
Start: 2023-12-11 | End: 2024-12-10

## 2023-12-11 RX ORDER — LACOSAMIDE 150 MG/1
150 TABLET ORAL EVERY 12 HOURS
Qty: 60 TABLET | Refills: 11
Start: 2023-12-11 | End: 2023-12-11 | Stop reason: SDUPTHER

## 2023-12-11 RX ORDER — GABAPENTIN 300 MG/1
300 CAPSULE ORAL 2 TIMES DAILY
Qty: 60 CAPSULE | Refills: 11
Start: 2023-12-11 | End: 2023-12-11 | Stop reason: HOSPADM

## 2023-12-11 RX ORDER — LACOSAMIDE 150 MG/1
150 TABLET ORAL EVERY 12 HOURS
Qty: 60 TABLET | Refills: 0 | Status: SHIPPED | OUTPATIENT
Start: 2023-12-11 | End: 2024-01-10

## 2023-12-11 RX ADMIN — MEMANTINE HYDROCHLORIDE 5 MG: 5 TABLET ORAL at 10:12

## 2023-12-11 RX ADMIN — SERTRALINE HYDROCHLORIDE 25 MG: 25 TABLET ORAL at 08:12

## 2023-12-11 RX ADMIN — LACOSAMIDE 150 MG: 100 TABLET, FILM COATED ORAL at 08:12

## 2023-12-11 RX ADMIN — GABAPENTIN 300 MG: 300 CAPSULE ORAL at 08:12

## 2023-12-11 RX ADMIN — LEVOTHYROXINE SODIUM 25 MCG: 25 TABLET ORAL at 05:12

## 2023-12-11 RX ADMIN — MUPIROCIN: 20 OINTMENT TOPICAL at 08:12

## 2023-12-11 RX ADMIN — LEVETIRACETAM 1000 MG: 500 TABLET, FILM COATED ORAL at 08:12

## 2023-12-11 RX ADMIN — AMLODIPINE BESYLATE 10 MG: 10 TABLET ORAL at 08:12

## 2023-12-11 NOTE — NURSING
Nurses Note -- 4 Eyes      12/8/2023   6:11 PM      Skin assessed during: Admit      [x] No Altered Skin Integrity Present    []Prevention Measures Documented      [] Yes- Altered Skin Integrity Present or Discovered   [] LDA Added if Not in Epic (Describe Wound)   [] New Altered Skin Integrity was Present on Admit and Documented in LDA   [] Wound Image Taken    Wound Care Consulted? No    Attending Nurse:  William CASTILLO    Second RN/Staff Member:   Felecia CASTILLO         
Patient is AAO*3 .  Had  Bladder movement . Acceptance to care  . Call light within reach ,safety precaution maintained . Will continue monitoring.  
Patient is alert and oriented x 4 with intermittent confusion. Patient is sinus mandie on the cardiac monitor. Patient is on room air. Vital signs all stable this shift and no complaints of pain. Patient using the bedpan with adequate urine output. Patient is in bed with call light in reach, side rails up x 3, and bed alarm set.   
Patient is alert and oriented x 4 with intermittent confusion. Patient is sinus mandie on the cardiac monitor. Patient is on room air. Vital signs all stable this shift and no complaints of pain. Patient using the bedpan with adequate urine output. Patient is in bed with call light in reach, side rails up x 3, and bed alarm set.   
Pt AAOx3, intermitted confusion, no c/o pain. Sister called, clarified with care plan, and pt's belongings are are MaryannJimi per sister information. Side rails upx2, bed alarm set, call light within reach.   
Pt AAOx4, intermitted confusion. No c/o pain. D/C per md order,  set up transportation to [facility]. reported given to nurse Son. Iv, tele removed per order. Pt left with all her belongings, include her shoes and her ankle braise, with the EMS, who took the medical records.   
Pt admitted to 14W. Vss. Afeb. A&O x4 most of the time. Pt seems to have some short term memory lost. Needing to be reoriented. Patient keeps asking about her personal belonging; cell phone, ipad, chargers, wallet, money all in a big purse/bag. Brother in law called on unit phone and she told this RN that he would bring her belonging to her when  she gets settled. Pt free of falls and injuries. Safety precaution in place. Call light in reach.  
2 seconds or less

## 2023-12-11 NOTE — PLAN OF CARE
[facility]    FACILITY TRANSFER ORDERS      Patient Name: Harvey Gould  YOB: 1980    PCP: Hima Gudino MD   PCP Address: [address] [place] [address] [id number]  PCP Phone Number: [phone]  PCP Fax: [phone]    Encounter Date: 12/11/2023    Admit to: NH    Vital Signs:  Routine    Diagnoses:   Active Hospital Problems    Diagnosis  POA    *Seizure [R56.9]  Yes     Priority: 1 - High    Breakthrough seizure [G40.919]  Yes    Hypokalemia [E87.6]  Yes    Essential hypertension [I10]  Yes    Hypothyroidism [E03.9]  Yes    Depression [F32.A]  Yes      Resolved Hospital Problems   No resolved problems to display.       Allergies:  Review of patient's allergies indicates:   Allergen Reactions    Percocet [oxycodone-acetaminophen] Hives       Diet: regular diet    Activities: Activity as tolerated    Goals of Care Treatment Preferences:  Code Status: Full Code      Nursing: Per facility      Labs:  None   needed      CONSULTS:    Physical Therapy to evaluate and treat. , Occupational Therapy to evaluate and treat., and  to evaluate for community resources/long-range planning.    MISCELLANEOUS CARE:  Routine Skin for Bedridden Patients: Apply moisture barrier cream to all skin folds and wet areas in perineal area daily and after baths and all bowel movements.    WOUND CARE ORDERS  None    Medications: Review discharge medications with patient and family and provide education.      Current Discharge Medication List        START taking these medications    Details   hydrOXYzine (ATARAX) 50 MG tablet Take 1 tablet (50 mg total) by mouth 4 (four) times daily as needed for Anxiety.      loratadine (CLARITIN) 10 mg tablet Take 1 tablet (10 mg total) by mouth once daily.  Refills: 0      multivitamin-iron-folic acid Tab Take 1 tablet by mouth once daily.      pregabalin (LYRICA) 75 MG capsule Take 1 capsule (75 mg total) by mouth 2 (two) times daily.  Qty: 60 capsule, Refills: 0       SENNA 8.6 mg tablet Take 1 tablet by mouth daily as needed for Constipation.           CONTINUE these medications which have CHANGED    Details   lacosamide (VIMPAT) 150 mg Tab tablet Take 1 tablet (150 mg total) by mouth every 12 (twelve) hours.  Qty: 60 tablet, Refills: 11      lactulose (CHRONULAC) 10 gram/15 mL solution Take 15 mLs (10 g total) by mouth 2 (two) times daily.  Refills: 0           CONTINUE these medications which have NOT CHANGED    Details   amLODIPine (NORVASC) 10 MG tablet Take 10 mg by mouth.      levETIRAcetam (KEPPRA) 1000 MG tablet Take 1 tablet (1,000 mg total) by mouth 2 (two) times daily.  Qty: 60 tablet, Refills: 0      levothyroxine (SYNTHROID) 25 MCG tablet Take 25 mcg by mouth.      memantine (NAMENDA) 5 MG Tab Take 5 mg by mouth once daily.      sertraline (ZOLOFT) 25 MG tablet Take 25 mg by mouth.                Immunizations Administered as of 12/11/2023       No immunizations on file.            This patient has had both covid vaccinations    Some patients may experience side effects after vaccination.  These may include fever, headache, muscle or joint aches.  Most symptoms resolve with 24-48 hours and do not require urgent medical evaluation unless they persist for more than 72 hours or symptoms are concerning for an unrelated medical condition.          _________________________________  Hiren Ghosh MD  12/11/2023

## 2023-12-11 NOTE — PLAN OF CARE
12/11/23 1328   Post-Acute Status   Post-Acute Authorization Placement   Post-Acute Placement Status Set-up Complete/Auth obtained   Discharge Delays None known at this time   Discharge Plan   Discharge Plan A Return to nursing home  ([facility] Phone: [phone])     CM received faxed copy of the patients current NH medication list and provided to the patients attending.     CM spoke with patients Kamila corado # [phone] to discuss any changes in discharge planning.  [facility] Phone: [phone]      No changes in DC plans. JORGE: 12/111/23.    Assigned nurse updated on the room number of 111 A and to call report to [phone]. Transportation scheduled for 2:45 pm .    2:15 pm  CM sent updated NH orders via [facility] and spoke with Jacqueline hutchins [facility] and a hard script is needed for the Vim pat    2:40 pm  [place] sc[place] [facility]      Luisa Loja RN  Case Management  [facility]  [phone]

## 2023-12-12 LAB
BACTERIA BLD CULT: NORMAL
BACTERIA BLD CULT: NORMAL

## 2023-12-12 NOTE — DISCHARGE SUMMARY
[facility] ([address] Medicine  Discharge Summary      Patient Name: Harvey Gould  MRN: [id number]  JENIFFER: [id number]  Patient Class: IP- Inpatient  Admission Date: 12/7/2023  Hospital Length of Stay: 4 days  Discharge Date and Time: 12/11/2023  4:22 PM  Attending Physician: Vannesa att. providers found   Discharging Provider: Hiren Ghosh MD  Primary Care Provider: Hima Gudino MD  [facility] Medicine Team: [facility]  Hiren Ghosh MD  Primary Care Team: [facility]    HPI:   42 y/o F w/ PMH of HTN, seizures, alcohol use disorder, hypothyroidism, non-ruptured cerebral aneurysm, TBI, and previous strokes presenting to ED from [facility] w/ seizure activity. Per [facility] DON, patient is neurologically intact and uses a wheelchair at baseline d/t residual L sided weakness from previous stroke. She reports patient was found to be having seizure-like activity on the outside patio by nursing home staff. States episode lasted approx 15-20 minutes. EMS administered 15mg of Versed (10mg IM and 5mg IV), after which she was brought to the ED. Unresponsive on presentation and subsequently intubated for airway protection. Code Stroke activated in ED, CTA unremarkable for evidence of acute hemorrhage, LVO, or critical stenosis. Focal encephalomalacia in inferior L frontal lobe, site of prior traumatic contusion. Per ED staff, patient continued to have seizure-like activity following intubation requiring propofol and fentanyl gtts. Loaded w/ 2g Keppra. Sedation weaned, no further seizure activity noted. Placed on ROYAL - bihemispheric slowing, but no seizures per Epilepsy. Awake and alert, following all commands after discontinuation of propofol and fentanyl. Extubated in ED w/ no issues. Oriented x 4.     * No surgery found *      Hospital Course:   Received 15 of Versed per EMS and was subsequently intubated and started on Propofol and Fentanyl in the ED. On my eval was  awake and following commands. EEG cap without seizure activity. Stopped sedation and extubated in ED without difficulty. No further seizure activity.  Neurology consulted.  Vimpat increase to 150 mg twice daily.  Patient discharged back to nursing home.     Goals of Care Treatment Preferences:  Code Status: Full Code      Consults:   Consults (From admission, onward)          Status Ordering Provider     Inpatient consult to Neurology  Once        Provider:  (Not yet assigned)    Completed SALVATORE OLIVEROS     Inpatient consult to Vascular (Stroke) Neurology  Once        Provider:  (Not yet assigned)    Completed EVA MANRIQUE            No new Assessment & Plan notes have been filed under this hospital service since the last note was generated.  Service: Hospital Medicine    Final Active Diagnoses:    Diagnosis Date Noted POA    PRINCIPAL PROBLEM:  Seizure [R56.9] 12/07/2023 Yes    Breakthrough seizure [G40.919] 12/09/2023 Yes    Hypokalemia [E87.6] 12/08/2023 Yes    Essential hypertension [I10] 12/07/2023 Yes    Hypothyroidism [E03.9] 12/07/2023 Yes    Depression [F32.A] 12/07/2023 Yes      Problems Resolved During this Admission:       Discharged Condition: good    Disposition: Skilled Nursing Facility    Follow Up:   Follow-up Information       Dr. Brandon Duran Follow up on 12/18/2023.    Contact information:  [phone] [address] [facility].   Monday December 18 @ 9:30                         Patient Instructions:      Diet Adult Regular     Notify your health care provider if you experience any of the following:  increased confusion or weakness     Notify your health care provider if you experience any of the following:  persistent dizziness, light-headedness, or visual disturbances     Notify your health care provider if you experience any of the following:  worsening rash     Notify your health care provider if you experience any of the following:  severe persistent headache     Notify your health care  provider if you experience any of the following:  difficulty breathing or increased cough     Notify your health care provider if you experience any of the following:  redness, tenderness, or signs of infection (pain, swelling, redness, odor or green/yellow discharge around incision site)     Notify your health care provider if you experience any of the following:  severe uncontrolled pain     Notify your health care provider if you experience any of the following:  persistent nausea and vomiting or diarrhea     Notify your health care provider if you experience any of the following:  temperature >100.4     Activity as tolerated       Significant Diagnostic Studies: N/A    Pending Diagnostic Studies:       None           Medications:  Reconciled Home Medications:      Medication List        START taking these medications      hydrOXYzine 50 MG tablet  Commonly known as: ATARAX  Take 1 tablet (50 mg total) by mouth 4 (four) times daily as needed for Anxiety.     loratadine 10 mg tablet  Commonly known as: CLARITIN  Take 1 tablet (10 mg total) by mouth once daily.     multivitamin-iron-folic acid Tab  Take 1 tablet by mouth once daily.     pregabalin 75 MG capsule  Commonly known as: LYRICA  Take 1 capsule (75 mg total) by mouth 2 (two) times daily.     senna 8.6 mg tablet  Commonly known as: SENNA  Take 1 tablet by mouth daily as needed for Constipation.            CHANGE how you take these medications      lacosamide 150 mg Tab tablet  Commonly known as: VIMPAT  Take 1 tablet (150 mg total) by mouth every 12 (twelve) hours.  What changed:   medication strength  how much to take     lactulose 10 gram/15 mL solution  Commonly known as: CHRONULAC  Take 15 mLs (10 g total) by mouth 2 (two) times daily.  What changed: when to take this            CONTINUE taking these medications      amLODIPine 10 MG tablet  Commonly known as: NORVASC  Take 10 mg by mouth.     levETIRAcetam 1000 MG tablet  Commonly known as:  KEPPRA  Take 1 tablet (1,000 mg total) by mouth 2 (two) times daily.     levothyroxine 25 MCG tablet  Commonly known as: SYNTHROID  Take 25 mcg by mouth.     memantine 5 MG Tab  Commonly known as: NAMENDA  Take 5 mg by mouth once daily.     sertraline 25 MG tablet  Commonly known as: ZOLOFT  Take 25 mg by mouth.              Indwelling Lines/Drains at time of discharge:   Lines/Drains/Airways       None                   Time spent on the discharge of patient: 35 minutes         Hiren Ghosh MD  Department of Hospital Medicine  [facility] - Intensive Care ([place])

## 2023-12-14 ENCOUNTER — PATIENT OUTREACH (OUTPATIENT)
Dept: ADMINISTRATIVE | Facility: CLINIC | Age: 43
End: 2023-12-14
Payer: MEDICARE

## 2025-03-03 ENCOUNTER — HOSPITAL ENCOUNTER (EMERGENCY)
Facility: HOSPITAL | Age: 45
Discharge: NURSING, REHAB OR LONG-TERM CARE | End: 2025-03-04
Attending: EMERGENCY MEDICINE
Payer: MEDICARE

## 2025-03-03 DIAGNOSIS — R41.82 AMS (ALTERED MENTAL STATUS): ICD-10-CM

## 2025-03-03 LAB
ALBUMIN SERPL BCP-MCNC: 4.4 G/DL (ref 3.5–5.2)
ALP SERPL-CCNC: 132 U/L (ref 40–150)
ALT SERPL W/O P-5'-P-CCNC: 21 U/L (ref 10–44)
ANION GAP SERPL CALC-SCNC: 14 MMOL/L (ref 8–16)
AST SERPL-CCNC: 36 U/L (ref 10–40)
BACTERIA #/AREA URNS AUTO: NORMAL /HPF
BASOPHILS # BLD AUTO: 0.06 K/UL (ref 0–0.2)
BASOPHILS NFR BLD: 0.7 % (ref 0–1.9)
BILIRUB SERPL-MCNC: 0.3 MG/DL (ref 0.1–1)
BILIRUB UR QL STRIP: NEGATIVE
BUN SERPL-MCNC: 7 MG/DL (ref 6–20)
CALCIUM SERPL-MCNC: 9.7 MG/DL (ref 8.7–10.5)
CHLORIDE SERPL-SCNC: 111 MMOL/L (ref 95–110)
CLARITY UR REFRACT.AUTO: CLEAR
CO2 SERPL-SCNC: 23 MMOL/L (ref 23–29)
COLOR UR AUTO: COLORLESS
CREAT SERPL-MCNC: 0.6 MG/DL (ref 0.5–1.4)
DIFFERENTIAL METHOD BLD: ABNORMAL
EOSINOPHIL # BLD AUTO: 0.1 K/UL (ref 0–0.5)
EOSINOPHIL NFR BLD: 1.2 % (ref 0–8)
ERYTHROCYTE [DISTWIDTH] IN BLOOD BY AUTOMATED COUNT: 12.2 % (ref 11.5–14.5)
EST. GFR  (NO RACE VARIABLE): >60 ML/MIN/1.73 M^2
GLUCOSE SERPL-MCNC: 91 MG/DL (ref 70–110)
GLUCOSE UR QL STRIP: NEGATIVE
HCT VFR BLD AUTO: 48.9 % (ref 37–48.5)
HGB BLD-MCNC: 16.9 G/DL (ref 12–16)
HGB UR QL STRIP: NEGATIVE
IMM GRANULOCYTES # BLD AUTO: 0.02 K/UL (ref 0–0.04)
IMM GRANULOCYTES NFR BLD AUTO: 0.2 % (ref 0–0.5)
KETONES UR QL STRIP: NEGATIVE
LEUKOCYTE ESTERASE UR QL STRIP: ABNORMAL
LYMPHOCYTES # BLD AUTO: 4.3 K/UL (ref 1–4.8)
LYMPHOCYTES NFR BLD: 53.3 % (ref 18–48)
MAGNESIUM SERPL-MCNC: 1.9 MG/DL (ref 1.6–2.6)
MCH RBC QN AUTO: 28.9 PG (ref 27–31)
MCHC RBC AUTO-ENTMCNC: 34.6 G/DL (ref 32–36)
MCV RBC AUTO: 84 FL (ref 82–98)
MICROSCOPIC COMMENT: NORMAL
MONOCYTES # BLD AUTO: 0.2 K/UL (ref 0.3–1)
MONOCYTES NFR BLD: 2.7 % (ref 4–15)
NEUTROPHILS # BLD AUTO: 3.4 K/UL (ref 1.8–7.7)
NEUTROPHILS NFR BLD: 41.9 % (ref 38–73)
NITRITE UR QL STRIP: NEGATIVE
NRBC BLD-RTO: 0 /100 WBC
PH UR STRIP: 7 [PH] (ref 5–8)
PLATELET # BLD AUTO: 275 K/UL (ref 150–450)
PMV BLD AUTO: 9.6 FL (ref 9.2–12.9)
POTASSIUM SERPL-SCNC: 3.6 MMOL/L (ref 3.5–5.1)
PROT SERPL-MCNC: 9 G/DL (ref 6–8.4)
PROT UR QL STRIP: NEGATIVE
RBC # BLD AUTO: 5.84 M/UL (ref 4–5.4)
RBC #/AREA URNS AUTO: 1 /HPF (ref 0–4)
SODIUM SERPL-SCNC: 148 MMOL/L (ref 136–145)
SP GR UR STRIP: 1.01 (ref 1–1.03)
SQUAMOUS #/AREA URNS AUTO: 1 /HPF
URN SPEC COLLECT METH UR: ABNORMAL
WBC # BLD AUTO: 8.03 K/UL (ref 3.9–12.7)
WBC #/AREA URNS AUTO: 3 /HPF (ref 0–5)

## 2025-03-03 PROCEDURE — 93005 ELECTROCARDIOGRAM TRACING: CPT

## 2025-03-03 PROCEDURE — 85025 COMPLETE CBC W/AUTO DIFF WBC: CPT | Performed by: EMERGENCY MEDICINE

## 2025-03-03 PROCEDURE — 80053 COMPREHEN METABOLIC PANEL: CPT | Performed by: EMERGENCY MEDICINE

## 2025-03-03 PROCEDURE — 81001 URINALYSIS AUTO W/SCOPE: CPT | Performed by: EMERGENCY MEDICINE

## 2025-03-03 PROCEDURE — 99285 EMERGENCY DEPT VISIT HI MDM: CPT | Mod: 25

## 2025-03-03 PROCEDURE — 83735 ASSAY OF MAGNESIUM: CPT | Performed by: EMERGENCY MEDICINE

## 2025-03-03 PROCEDURE — 93010 ELECTROCARDIOGRAM REPORT: CPT | Mod: ,,, | Performed by: INTERNAL MEDICINE

## 2025-03-04 VITALS
HEART RATE: 82 BPM | TEMPERATURE: 98 F | WEIGHT: 112 LBS | SYSTOLIC BLOOD PRESSURE: 166 MMHG | RESPIRATION RATE: 16 BRPM | DIASTOLIC BLOOD PRESSURE: 90 MMHG | HEIGHT: 59 IN | OXYGEN SATURATION: 95 % | BODY MASS INDEX: 22.58 KG/M2

## 2025-03-04 LAB
OHS QRS DURATION: 88 MS
OHS QTC CALCULATION: 421 MS

## 2025-03-04 NOTE — DISCHARGE INSTRUCTIONS
Per my exam in the emergency department and the history that has been provided by the patient I am not concerned for altered mental status at this time.    It is there any significant changes in the patient's presentation she you may return her for re-evaluation, please provide additional documentation.    No concerns of infectious process and CT of the head is near her baseline.

## 2025-03-04 NOTE — ED TRIAGE NOTES
Pt arrives via EMS from [facility] with reports of being unsure of why EMS was called. Pt states that she feels completely fine and denies sob, chest pain, blurred vision, headaches, abnormal urination.

## 2025-03-04 NOTE — ED PROVIDER NOTES
"Encounter Date: 3/3/2025       History     Chief Complaint   Patient presents with    Altered Mental Status     AMS x2 days. Arrives from [facility]. Alert to person, place. Hx of neuro deficits. Typically gets around the facility in a wheelchair but has been in bed over the last 2 days.     Patient is a 44-year-old female significant history of alcohol use disorder, seizure disorder, depression, hypertension, hypothyroidism and intracranial hemorrhage the presented to the emergency department from [facility] due to nurse report of altered mental status x2 days.  Patient however is alert and orientated and is unclear why she is present in the emergency department.  She reports that she has feels fine, however due to living in a nursing home that has occasionally become "loopy".  She is aware of the situation.  She does wish to return back to [facility].  Denies recent fevers chills nausea or vomiting.      Review of patient's allergies indicates:   Allergen Reactions    Percocet [oxycodone-acetaminophen] Hives     Past Medical History:   Diagnosis Date    Alcohol use, unspecified with unspecified alcohol-induced disorder     Depression     Dysphagia     Hypertension     Hypothyroidism, unspecified     Seizures      Past Surgical History:   Procedure Laterality Date    brain Surgery      ESOPHAGOGASTRODUODENOSCOPY N/A 7/6/2023    Procedure: EGD (ESOPHAGOGASTRODUODENOSCOPY);  Surgeon: Carol Alfonso MD;  Location: [facility];  Service: Endoscopy;  Laterality: N/A;    TUBAL LIGATION       No family history on file.  Social History[1]  Review of Systems  See HPI  Physical Exam     Initial Vitals [03/03/25 1423]   BP Pulse Resp Temp SpO2   (!) 128/90 75 16 97.9 °F (36.6 °C) 99 %      MAP       --         Physical Exam    Vitals reviewed.  Constitutional: She appears well-developed and well-nourished.   She is well-appearing   HENT:   Head: Normocephalic and atraumatic.   Eyes: Conjunctivae and EOM are " normal.   Neck:   Normal range of motion.  Cardiovascular:  Normal rate.           Pulmonary/Chest: No respiratory distress.   Abdominal: Abdomen is soft. She exhibits no distension.   Musculoskeletal:      Cervical back: Normal range of motion.      Comments: At baseline ambulates with a walker.     Neurological: She is alert and oriented to person, place, and time.   Patient is oriented to self place and time  Speech is clear   Skin: Skin is warm and dry.   Psychiatric: She has a normal mood and affect. Thought content normal.     I will need    ED Course   Procedures  Labs Reviewed   CBC W/ AUTO DIFFERENTIAL - Abnormal       Result Value    WBC 8.03      RBC 5.84 (*)     Hemoglobin 16.9 (*)     Hematocrit 48.9 (*)     MCV 84      MCH 28.9      MCHC 34.6      RDW 12.2      Platelets 275      MPV 9.6      Immature Granulocytes 0.2      Gran # (ANC) 3.4      Immature Grans (Abs) 0.02      Lymph # 4.3      Mono # 0.2 (*)     Eos # 0.1      Baso # 0.06      nRBC 0      Gran % 41.9      Lymph % 53.3 (*)     Mono % 2.7 (*)     Eosinophil % 1.2      Basophil % 0.7      Differential Method Automated     COMPREHENSIVE METABOLIC PANEL - Abnormal    Sodium 148 (*)     Potassium 3.6      Chloride 111 (*)     CO2 23      Glucose 91      BUN 7      Creatinine 0.6      Calcium 9.7      Total Protein 9.0 (*)     Albumin 4.4      Total Bilirubin 0.3      Alkaline Phosphatase 132      AST 36      ALT 21      eGFR >60.0      Anion Gap 14     URINALYSIS, REFLEX TO URINE CULTURE - Abnormal    Specimen UA Urine, Clean Catch      Color, UA Colorless (*)     Appearance, UA Clear      pH, UA 7.0      Specific Gravity, UA 1.010      Protein, UA Negative      Glucose, UA Negative      Ketones, UA Negative      Bilirubin (UA) Negative      Occult Blood UA Negative      Nitrite, UA Negative      Leukocytes, UA 1+ (*)     Narrative:     Specimen Source->Urine   MAGNESIUM    Magnesium 1.9     URINALYSIS MICROSCOPIC    RBC, UA 1      WBC, UA 3       Bacteria Rare      Squam Epithel, UA 1      Microscopic Comment SEE COMMENT      Narrative:     Specimen Source->Urine          Imaging Results              CT Head Without Contrast (Final result)  Result time 03/03/25 20:48:40      Final result by Ajay Mesa MD (03/03/25 20:48:40)                   Impression:      No CT evidence of acute intracranial abnormality. If the patient has an acute, focal neurological deficit, MRI of the brain may be indicated.    Chronic changes including large region of encephalomalacia in the right MCA distribution and encephalomalacia in the anteromedial left frontal lobe.    Additional findings discussed in the body of the report.      Electronically signed by: Ajay Mesa MD  Date:    03/03/2025  Time:    20:48               Narrative:    EXAMINATION:  CT HEAD WITHOUT CONTRAST    CLINICAL HISTORY:  Mental status change, unknown cause;    TECHNIQUE:  Low dose axial images were obtained through the head.  Coronal and sagittal reformations were also performed. Contrast was not administered.    COMPARISON:  MRI, 12/07/2023.  CT head, 01/04/2023.    FINDINGS:  Generalized cerebral volume loss with ex vacuo dilation of the ventricles and sulci.  Large remote right MCA distribution encephalomalacia as seen previously, with asymmetric distension of the right lateral ventricle likely from ex vacuo dilation.  Persistent encephalomalacia in the anteromedial left frontal lobe, potentially from prior trauma or prior infarct.    No evidence of acute territorial infarct, hemorrhage, or midline shift.  No overt midline shift allowing for volume loss in the right cerebral hemisphere.  Mild periventricular white matter hypoattenuation suggestive of chronic microvascular ischemic change.  Remote lacunar infarct in the right basal ganglia/thalamus.  Subtle calcifications in the right superior cerebellum, less conspicuous when compared with the prior CT.  Subtle nonspecific increased  density in the right MCA region with small calcification in this area.  If there is a specific concern for superimposed right MCA infarct, MRI would provide improved sensitivity.  This finding is likely similar to prior CT.    Ventricles are similar in size and configuration to prior study.    No displaced calvarial fracture.  Operative changes in the right frontal calvarium.    Mucosal thickening in the inferior left maxillary sinus.  Otherwise, the visualized paranasal sinuses and mastoid air cells are essentially clear.                                       Medications - No data to display  Medical Decision Making  Patient  is a 44 y.o.  female  presenting to the emergency department with complaints of  subjective altered mental status appears orientated in emergency department    Will rule out new intracranial bleed, infectious process, electrolyte derangement    Labs and imaging without acute changes.  Contacted nursing staff no additional information provided.  At this time patient appears stable to return to [facility].  Pt discussed with supervising physician      Amount and/or Complexity of Data Reviewed  External Data Reviewed: notes.                                      Clinical Impression:  Final diagnoses:  [R41.82] AMS (altered mental status)                   [1]   Social History  Tobacco Use    Smoking status: Former     Current packs/day: 1.00     Average packs/day: 1 pack/day for 15.0 years (15.0 ttl pk-yrs)     Types: Cigarettes    Smokeless tobacco: Former   Substance Use Topics    Alcohol use: Not Currently        Idalia Abreu PA-C  03/03/25 2692